# Patient Record
Sex: FEMALE | Race: BLACK OR AFRICAN AMERICAN | URBAN - METROPOLITAN AREA
[De-identification: names, ages, dates, MRNs, and addresses within clinical notes are randomized per-mention and may not be internally consistent; named-entity substitution may affect disease eponyms.]

---

## 2017-02-15 ENCOUNTER — INPATIENT (INPATIENT)
Facility: HOSPITAL | Age: 72
LOS: 7 days | Discharge: HOME | DRG: 255 | End: 2017-02-23
Attending: INTERNAL MEDICINE | Admitting: INTERNAL MEDICINE
Payer: MEDICARE

## 2017-02-15 VITALS
DIASTOLIC BLOOD PRESSURE: 61 MMHG | OXYGEN SATURATION: 100 % | HEART RATE: 87 BPM | SYSTOLIC BLOOD PRESSURE: 129 MMHG | WEIGHT: 104.94 LBS | TEMPERATURE: 97 F | RESPIRATION RATE: 17 BRPM

## 2017-02-15 DIAGNOSIS — F99 MENTAL DISORDER, NOT OTHERWISE SPECIFIED: ICD-10-CM

## 2017-02-15 DIAGNOSIS — Z98.89 OTHER SPECIFIED POSTPROCEDURAL STATES: Chronic | ICD-10-CM

## 2017-02-15 DIAGNOSIS — L98.499 NON-PRESSURE CHRONIC ULCER OF SKIN OF OTHER SITES WITH UNSPECIFIED SEVERITY: ICD-10-CM

## 2017-02-15 DIAGNOSIS — R63.4 ABNORMAL WEIGHT LOSS: ICD-10-CM

## 2017-02-15 DIAGNOSIS — D64.9 ANEMIA, UNSPECIFIED: ICD-10-CM

## 2017-02-15 DIAGNOSIS — E11.9 TYPE 2 DIABETES MELLITUS WITHOUT COMPLICATIONS: ICD-10-CM

## 2017-02-15 DIAGNOSIS — Z09 ENCOUNTER FOR FOLLOW-UP EXAMINATION AFTER COMPLETED TREATMENT FOR CONDITIONS OTHER THAN MALIGNANT NEOPLASM: Chronic | ICD-10-CM

## 2017-02-15 DIAGNOSIS — D69.6 THROMBOCYTOPENIA, UNSPECIFIED: ICD-10-CM

## 2017-02-15 DIAGNOSIS — R63.8 OTHER SYMPTOMS AND SIGNS CONCERNING FOOD AND FLUID INTAKE: ICD-10-CM

## 2017-02-15 DIAGNOSIS — M79.645 PAIN IN LEFT FINGER(S): ICD-10-CM

## 2017-02-15 DIAGNOSIS — I73.9 PERIPHERAL VASCULAR DISEASE, UNSPECIFIED: ICD-10-CM

## 2017-02-15 DIAGNOSIS — Z41.8 ENCOUNTER FOR OTHER PROCEDURES FOR PURPOSES OTHER THAN REMEDYING HEALTH STATE: ICD-10-CM

## 2017-02-15 LAB
ALBUMIN SERPL ELPH-MCNC: 2 G/DL — LOW (ref 3.4–5)
ALBUMIN SERPL ELPH-MCNC: 2.1 G/DL — LOW (ref 3.4–5)
ALP SERPL-CCNC: 90 U/L — SIGNIFICANT CHANGE UP (ref 40–120)
ALP SERPL-CCNC: 92 U/L — SIGNIFICANT CHANGE UP (ref 40–120)
ALT FLD-CCNC: 10 U/L — LOW (ref 12–42)
ALT FLD-CCNC: 15 U/L — SIGNIFICANT CHANGE UP (ref 12–42)
ANION GAP SERPL CALC-SCNC: 12 MMOL/L — SIGNIFICANT CHANGE UP (ref 9–16)
ANION GAP SERPL CALC-SCNC: 9 MMOL/L — SIGNIFICANT CHANGE UP (ref 9–16)
ANION GAP SERPL CALC-SCNC: 9 MMOL/L — SIGNIFICANT CHANGE UP (ref 9–16)
APTT BLD: 32.2 SEC — SIGNIFICANT CHANGE UP (ref 27.5–37.4)
APTT BLD: 32.5 SEC — SIGNIFICANT CHANGE UP (ref 27.5–37.4)
AST SERPL-CCNC: 14 U/L — LOW (ref 15–37)
AST SERPL-CCNC: 9 U/L — LOW (ref 15–37)
BILIRUB SERPL-MCNC: 0.2 MG/DL — SIGNIFICANT CHANGE UP (ref 0.2–1.2)
BILIRUB SERPL-MCNC: 0.2 MG/DL — SIGNIFICANT CHANGE UP (ref 0.2–1.2)
BLD GP AB SCN SERPL QL: NEGATIVE — SIGNIFICANT CHANGE UP
BUN SERPL-MCNC: 34 MG/DL — HIGH (ref 7–23)
BUN SERPL-MCNC: 36 MG/DL — HIGH (ref 7–23)
BUN SERPL-MCNC: 38 MG/DL — HIGH (ref 7–23)
CALCIUM SERPL-MCNC: 7 MG/DL — LOW (ref 8.5–10.5)
CALCIUM SERPL-MCNC: 7.4 MG/DL — LOW (ref 8.5–10.5)
CALCIUM SERPL-MCNC: 7.6 MG/DL — LOW (ref 8.5–10.5)
CHLORIDE SERPL-SCNC: 126 MMOL/L — HIGH (ref 96–108)
CHLORIDE SERPL-SCNC: 126 MMOL/L — HIGH (ref 96–108)
CHLORIDE SERPL-SCNC: 127 MMOL/L — HIGH (ref 96–108)
CO2 SERPL-SCNC: 15 MMOL/L — LOW (ref 22–31)
CO2 SERPL-SCNC: 16 MMOL/L — LOW (ref 22–31)
CO2 SERPL-SCNC: 18 MMOL/L — LOW (ref 22–31)
CREAT SERPL-MCNC: 1.02 MG/DL — SIGNIFICANT CHANGE UP (ref 0.5–1.3)
CREAT SERPL-MCNC: 1.08 MG/DL — SIGNIFICANT CHANGE UP (ref 0.5–1.3)
CREAT SERPL-MCNC: 1.13 MG/DL — SIGNIFICANT CHANGE UP (ref 0.5–1.3)
FERRITIN SERPL-MCNC: 228.4 NG/ML — SIGNIFICANT CHANGE UP (ref 8–252)
GLUCOSE SERPL-MCNC: 130 MG/DL — HIGH (ref 70–99)
GLUCOSE SERPL-MCNC: 79 MG/DL — SIGNIFICANT CHANGE UP (ref 70–99)
GLUCOSE SERPL-MCNC: 96 MG/DL — SIGNIFICANT CHANGE UP (ref 70–99)
HBA1C BLD-MCNC: <3.5 % — LOW (ref 4.8–5.6)
HCT VFR BLD CALC: 22.7 % — LOW (ref 34.5–45)
HCT VFR BLD CALC: 23.8 % — LOW (ref 34.5–45)
HGB BLD-MCNC: 7.3 G/DL — LOW (ref 11.5–15.5)
HGB BLD-MCNC: 7.6 G/DL — LOW (ref 11.5–15.5)
INR BLD: 1.78 — HIGH (ref 0.88–1.16)
INR BLD: 1.86 — HIGH (ref 0.88–1.16)
IRON SATN MFR SERPL: 28 % — SIGNIFICANT CHANGE UP (ref 20–38)
IRON SATN MFR SERPL: 57 UG/DL — SIGNIFICANT CHANGE UP (ref 50–170)
MAGNESIUM SERPL-MCNC: 1.5 MG/DL — LOW (ref 1.6–2.4)
MAGNESIUM SERPL-MCNC: 1.6 MG/DL — SIGNIFICANT CHANGE UP (ref 1.6–2.4)
MCHC RBC-ENTMCNC: 27.4 PG — SIGNIFICANT CHANGE UP (ref 27–34)
MCHC RBC-ENTMCNC: 27.7 PG — SIGNIFICANT CHANGE UP (ref 27–34)
MCHC RBC-ENTMCNC: 31.9 G/DL — LOW (ref 32–36)
MCHC RBC-ENTMCNC: 32.2 G/DL — SIGNIFICANT CHANGE UP (ref 32–36)
MCV RBC AUTO: 85.9 FL — SIGNIFICANT CHANGE UP (ref 80–100)
MCV RBC AUTO: 86 FL — SIGNIFICANT CHANGE UP (ref 80–100)
PHOSPHATE SERPL-MCNC: 3 MG/DL — SIGNIFICANT CHANGE UP (ref 2.5–4.5)
PHOSPHATE SERPL-MCNC: 3 MG/DL — SIGNIFICANT CHANGE UP (ref 2.5–4.5)
PLATELET # BLD AUTO: 63 K/UL — LOW (ref 150–400)
PLATELET # BLD AUTO: 93 K/UL — LOW (ref 150–400)
POTASSIUM SERPL-MCNC: 3.5 MMOL/L — SIGNIFICANT CHANGE UP (ref 3.5–5.3)
POTASSIUM SERPL-MCNC: 3.7 MMOL/L — SIGNIFICANT CHANGE UP (ref 3.5–5.3)
POTASSIUM SERPL-MCNC: 4.1 MMOL/L — SIGNIFICANT CHANGE UP (ref 3.5–5.3)
POTASSIUM SERPL-SCNC: 3.5 MMOL/L — SIGNIFICANT CHANGE UP (ref 3.5–5.3)
POTASSIUM SERPL-SCNC: 3.7 MMOL/L — SIGNIFICANT CHANGE UP (ref 3.5–5.3)
POTASSIUM SERPL-SCNC: 4.1 MMOL/L — SIGNIFICANT CHANGE UP (ref 3.5–5.3)
PROT SERPL-MCNC: 6.3 G/DL — LOW (ref 6.4–8.2)
PROT SERPL-MCNC: 6.4 G/DL — SIGNIFICANT CHANGE UP (ref 6.4–8.2)
PROTHROM AB SERPL-ACNC: 19.9 SEC — HIGH (ref 10–13.1)
PROTHROM AB SERPL-ACNC: 20.8 SEC — HIGH (ref 10–13.1)
RBC # BLD: 2.64 M/UL — LOW (ref 3.8–5.2)
RBC # BLD: 2.77 M/UL — LOW (ref 3.8–5.2)
RBC # FLD: 19.5 % — HIGH (ref 10.3–16.9)
RBC # FLD: 20.2 % — HIGH (ref 10.3–16.9)
RH IG SCN BLD-IMP: POSITIVE — SIGNIFICANT CHANGE UP
SODIUM SERPL-SCNC: 152 MMOL/L — HIGH (ref 135–145)
SODIUM SERPL-SCNC: 153 MMOL/L — HIGH (ref 135–145)
SODIUM SERPL-SCNC: 153 MMOL/L — HIGH (ref 135–145)
TIBC SERPL-MCNC: 204 UG/DL — LOW (ref 250–450)
TRANSFERRIN SERPL-MCNC: 168 MG/DL — LOW (ref 200–360)
TSH SERPL-MCNC: 1.98 UIU/ML — SIGNIFICANT CHANGE UP (ref 0.35–4.94)
WBC # BLD: 6.6 K/UL — SIGNIFICANT CHANGE UP (ref 3.8–10.5)
WBC # BLD: 7.3 K/UL — SIGNIFICANT CHANGE UP (ref 3.8–10.5)
WBC # FLD AUTO: 6.6 K/UL — SIGNIFICANT CHANGE UP (ref 3.8–10.5)
WBC # FLD AUTO: 7.3 K/UL — SIGNIFICANT CHANGE UP (ref 3.8–10.5)

## 2017-02-15 PROCEDURE — 74000: CPT | Mod: 26

## 2017-02-15 PROCEDURE — 93010 ELECTROCARDIOGRAM REPORT: CPT

## 2017-02-15 PROCEDURE — 73130 X-RAY EXAM OF HAND: CPT | Mod: 26,LT

## 2017-02-15 PROCEDURE — 99223 1ST HOSP IP/OBS HIGH 75: CPT

## 2017-02-15 PROCEDURE — 99291 CRITICAL CARE FIRST HOUR: CPT | Mod: 25

## 2017-02-15 PROCEDURE — 99292 CRITICAL CARE ADDL 30 MIN: CPT | Mod: 25

## 2017-02-15 RX ORDER — SODIUM CHLORIDE 9 MG/ML
1000 INJECTION INTRAMUSCULAR; INTRAVENOUS; SUBCUTANEOUS ONCE
Qty: 0 | Refills: 0 | Status: COMPLETED | OUTPATIENT
Start: 2017-02-15 | End: 2017-02-15

## 2017-02-15 RX ORDER — DIVALPROEX SODIUM 500 MG/1
500 TABLET, DELAYED RELEASE ORAL
Qty: 0 | Refills: 0 | Status: DISCONTINUED | OUTPATIENT
Start: 2017-02-15 | End: 2017-02-23

## 2017-02-15 RX ORDER — SODIUM CHLORIDE 9 MG/ML
1000 INJECTION, SOLUTION INTRAVENOUS
Qty: 0 | Refills: 0 | Status: DISCONTINUED | OUTPATIENT
Start: 2017-02-15 | End: 2017-02-15

## 2017-02-15 RX ORDER — FOLIC ACID 0.8 MG
1 TABLET ORAL DAILY
Qty: 0 | Refills: 0 | Status: DISCONTINUED | OUTPATIENT
Start: 2017-02-15 | End: 2017-02-23

## 2017-02-15 RX ORDER — VANCOMYCIN HCL 1 G
VIAL (EA) INTRAVENOUS
Qty: 0 | Refills: 0 | Status: DISCONTINUED | OUTPATIENT
Start: 2017-02-15 | End: 2017-02-15

## 2017-02-15 RX ORDER — PIPERACILLIN AND TAZOBACTAM 4; .5 G/20ML; G/20ML
3.38 INJECTION, POWDER, LYOPHILIZED, FOR SOLUTION INTRAVENOUS ONCE
Qty: 0 | Refills: 0 | Status: COMPLETED | OUTPATIENT
Start: 2017-02-15 | End: 2017-02-15

## 2017-02-15 RX ORDER — QUETIAPINE FUMARATE 200 MG/1
100 TABLET, FILM COATED ORAL AT BEDTIME
Qty: 0 | Refills: 0 | Status: DISCONTINUED | OUTPATIENT
Start: 2017-02-15 | End: 2017-02-23

## 2017-02-15 RX ORDER — INSULIN LISPRO 100/ML
VIAL (ML) SUBCUTANEOUS
Qty: 0 | Refills: 0 | Status: DISCONTINUED | OUTPATIENT
Start: 2017-02-15 | End: 2017-02-23

## 2017-02-15 RX ORDER — THIAMINE MONONITRATE (VIT B1) 100 MG
100 TABLET ORAL DAILY
Qty: 0 | Refills: 0 | Status: DISCONTINUED | OUTPATIENT
Start: 2017-02-15 | End: 2017-02-23

## 2017-02-15 RX ORDER — VANCOMYCIN HCL 1 G
750 VIAL (EA) INTRAVENOUS ONCE
Qty: 0 | Refills: 0 | Status: COMPLETED | OUTPATIENT
Start: 2017-02-15 | End: 2017-02-15

## 2017-02-15 RX ORDER — ACETAMINOPHEN 500 MG
650 TABLET ORAL EVERY 6 HOURS
Qty: 0 | Refills: 0 | Status: DISCONTINUED | OUTPATIENT
Start: 2017-02-15 | End: 2017-02-23

## 2017-02-15 RX ORDER — SODIUM CHLORIDE 9 MG/ML
1000 INJECTION, SOLUTION INTRAVENOUS
Qty: 0 | Refills: 0 | Status: DISCONTINUED | OUTPATIENT
Start: 2017-02-15 | End: 2017-02-16

## 2017-02-15 RX ORDER — HEPARIN SODIUM 5000 [USP'U]/ML
5000 INJECTION INTRAVENOUS; SUBCUTANEOUS EVERY 12 HOURS
Qty: 0 | Refills: 0 | Status: DISCONTINUED | OUTPATIENT
Start: 2017-02-15 | End: 2017-02-16

## 2017-02-15 RX ORDER — BRIMONIDINE TARTRATE 2 MG/MG
1 SOLUTION/ DROPS OPHTHALMIC
Qty: 0 | Refills: 0 | Status: DISCONTINUED | OUTPATIENT
Start: 2017-02-15 | End: 2017-02-23

## 2017-02-15 RX ADMIN — QUETIAPINE FUMARATE 100 MILLIGRAM(S): 200 TABLET, FILM COATED ORAL at 22:40

## 2017-02-15 RX ADMIN — Medication 250 MILLIGRAM(S): at 14:21

## 2017-02-15 RX ADMIN — DIVALPROEX SODIUM 500 MILLIGRAM(S): 500 TABLET, DELAYED RELEASE ORAL at 19:04

## 2017-02-15 RX ADMIN — Medication 1 TABLET(S): at 22:42

## 2017-02-15 RX ADMIN — Medication 100 MILLIGRAM(S): at 22:40

## 2017-02-15 RX ADMIN — SODIUM CHLORIDE 100 MILLILITER(S): 9 INJECTION, SOLUTION INTRAVENOUS at 22:39

## 2017-02-15 RX ADMIN — Medication 2: at 22:39

## 2017-02-15 RX ADMIN — SODIUM CHLORIDE 2000 MILLILITER(S): 9 INJECTION INTRAMUSCULAR; INTRAVENOUS; SUBCUTANEOUS at 13:04

## 2017-02-15 RX ADMIN — PIPERACILLIN AND TAZOBACTAM 200 GRAM(S): 4; .5 INJECTION, POWDER, LYOPHILIZED, FOR SOLUTION INTRAVENOUS at 13:04

## 2017-02-15 RX ADMIN — Medication 1 MILLIGRAM(S): at 22:42

## 2017-02-15 RX ADMIN — BRIMONIDINE TARTRATE 1 DROP(S): 2 SOLUTION/ DROPS OPHTHALMIC at 19:04

## 2017-02-15 NOTE — ED PROVIDER NOTE - ATTENDING CONTRIBUTION TO CARE
71yoF h/o bipolar, schizophrenia, HTN, Raynaud's syndrome, here with pain to L 2nd finger, started several weeks ago, getting worse. No f/c, no trauma or injury. Of note, pt's HHA reports pt with poor appetite and progressive functional decline, having lost over 40 lbs in the past few months. No cp/sob, no abd pain, no n/v/d. No dizziness. Vitals stable, exam with hyperpigmention to distal tip of L 2nd phalanx, o/w thin fragile woman. Pt seen by vascular no acute intervention recommended from their perspective, labs c/w dehydration, EKG unremarkable, no change in mental status, will admit for IV hydration, hypernatremia, failure to thrive. 71yoF h/o bipolar, schizophrenia, HTN, Raynaud's syndrome, here with pain to L 2nd finger, started several weeks ago, getting worse. No f/c, no trauma or injury. Of note, pt's HHA reports pt with poor appetite and progressive functional decline, having lost over 40 lbs in the past few months. No cp/sob, no abd pain, no n/v/d. No dizziness. Vitals stable, exam with hyperpigmention to distal tip of L 2nd phalanx c/w dry gangrene with surrounding cellulitis, o/w thin fragile woman. Pt seen by vascular no acute intervention recommended from their perspective, labs c/w dehydration, EKG unremarkable, no change in mental status, will admit for IV hydration, hypernatremia, failure to thrive.

## 2017-02-15 NOTE — ED ADULT NURSE NOTE - OBJECTIVE STATEMENT
Patient presents to ED with c/o pain to left 2nd finger. As per patient's aide, patient scheduled for amputation to left 2nd finger on 3/1/17 due to poor circulation however c/o severe pain. Discoloration noted to left second finger. Patient reports able to feel when touching. Radial pulse to left arm strong. No s/s acute distress noted. Awaiting to be seen.

## 2017-02-15 NOTE — CONSULT NOTE ADULT - ASSESSMENT
71F with 2nd digit cellulitis  -recommend left hand x-ray, 3 views to rule out osteomyelitis  -recommend hand surgery consultation for further work up and management  -local wound care for lower extremity lesion  -defer to hand surgery for operative plan for digit amputation, if any

## 2017-02-15 NOTE — H&P ADULT. - PROBLEM SELECTOR PLAN 2
Hb fluctuating, ranging 7-9 from previous admissions. Hb today 7.3. Patient with BRBPR 2 days ago, however, rectal exam in ED without gross blood. Stool exam: soft, brown stool. Low concern for GIB despite decreased Hb and elevated BUN. HD stable. Last colonoscopy in the last few months (though unreliable)   - Unknown cardiac function, will trend Hb and transfuse if < 7.   - maintain active type and screens   - iron studies, b12, folate, TSH   - will obtain collateral from PCP and nursing aid when she comes back to hospital

## 2017-02-15 NOTE — ED PROVIDER NOTE - OBJECTIVE STATEMENT
72 yo F poor historian w/ PMHx of bipolar d/o and schizophrenia, Parkinson's disease, PVD w/ possible Raynauds's, s/p rt middle finger amputation presents for progressively worsening pain and discoloration to left 2nd digit for the past few weeks.  Pt was scheduled to have amputation w/ Dr. Ferrara in March but came today because states pain and discolration getting worse.  pt also reports pain to rt 3rd digit for past few weeks.  pt otherwise denies fevers, chills, numbness/tingling, chest pain, sob, abdominal pain, accidents/injuries/traumas 72 yo F poor historian w/ PMHx of bipolar d/o and schizophrenia, Parkinson's disease, PVD w/ possible Raynauds's, s/p rt middle finger amputation presents for progressively worsening pain and discoloration to left 2nd digit for the past few weeks.  Pt was scheduled to have amputation w/ unknown hand surgeon in March   (?Dr. vincent) but came today because states pain and discoloration getting worse.  pt also reports pain to rt 3rd digit for past few weeks. As per home health aide pt also has lost significant amount of weight in the last few months 40+ lbs.  pt otherwise denies fevers, chills, numbness/tingling, chest pain, sob, abdominal pain, accidents/injuries/traumas

## 2017-02-15 NOTE — H&P ADULT. - ASSESSMENT
72 y/o female with pmhx of PVD s/p right finger amputation, bipolar, schizophrenia, parkinson's DM presents with worsening finger pain.

## 2017-02-15 NOTE — H&P ADULT. - ATTENDING COMMENTS
71F hx bipolar dx, schizophrenia, parkinson's, DM, PVD (suspected Raynaud's) s/p right middle finger amputation, hx of DVT (was on Xarelto, ? if still is) presents with L 2nd digit dry gangrene as chief complaint. Pt is scheduled for amputation in March, but pain worse now, prompting health aide to bring patient in. History from patient limited 2/2 lack of insight and ?cognitive impairment, but pt denies fevers, chills. She says that she has lost a significant amount of weight despite a normal appetite (unclear if she is eating well at home). She says she has had persistent N/V/D for "some time" that comes and goes and has recently had BRBPR, although brown stool noted on rectal exam (no guaiac performed). She was noted to have multiple skin ulcers, notably on her L thigh, R lower leg, and extensively on the back/buttock/sacral area. She appears generally not well cared for.  AFVSS  NAD, AAOx3, cachectic, chronically-ill appearing but not acutely "sick" on visual inspection  Dry MMs  CV/Pulm unremarkable  Abdomen distended and tympanic without TTP  Ext with multiple ulcers as described above, all dry-appearing and do not look infected, L 2nd digit with dry gangrene and TTP proximally  Labs and imaging reviewed  A/P: 71F with multiple medical and psychological problems as above, who is a poor historian, who presented with worsening pain in her L 2nd digit in setting of dry gangrene and found to have a myriad of other problems. Vascular consulted for gangrene and deferred to hand surgery, who was reportedly called from ED and want to defer operative management until more stable medically (this was per report of PGY-1, who was told this by the ED, none of which is documented in ED notes or consult notes). Will need to follow-up with hand surgery in the AM. Will hold abx currently as pt afebrile and without leukocytosis, thus not appearing acutely infected.   The patient in general appears severely malnourished and poorly cared for with a recent unquantified weight loss, cachectic, almost Kwashiorkor-like, appearance, and multiple decubiti/arterial-insufficiency ulcers. 71F hx bipolar dx, schizophrenia, parkinson's, DM, PVD (suspected Raynaud's) s/p right middle finger amputation, hx of DVT (was on Xarelto, ? if still is) presents with L 2nd digit dry gangrene as chief complaint. Pt is scheduled for amputation in March, but pain worse now, prompting health aide to bring patient in. History from patient limited 2/2 lack of insight and ?cognitive impairment, but pt denies fevers, chills. She says that she has lost a significant amount of weight despite a normal appetite (unclear if she is eating well at home). She says she has had persistent N/V/D for "some time" that comes and goes and has recently had BRBPR, although brown stool noted on rectal exam (no guaiac performed). She was noted to have multiple skin ulcers, notably on her L thigh, R lower leg, and extensively on the back/buttock/sacral area. She appears generally not well cared for.  AFVSS  NAD, AAOx3, cachectic, chronically-ill appearing but not acutely "sick" on visual inspection  Dry MMs  CV/Pulm unremarkable  Abdomen distended and tympanic without TTP  Ext with multiple ulcers as described above, all dry-appearing and do not look infected, L 2nd digit with dry gangrene and TTP proximally  Labs and imaging reviewed  A/P: 71F with multiple medical and psychological problems as above, who is a poor historian, who presented with worsening pain in her L 2nd digit in setting of dry gangrene and found to have a myriad of other problems. Vascular consulted for gangrene and deferred to hand surgery, who was reportedly called from ED and want to defer operative management until more stable medically (this was per report of PGY-1, who was told this by the ED, none of which is documented in ED notes or consult notes). Will need to follow-up with hand surgery in the AM. Will hold abx currently as pt afebrile and without leukocytosis, thus not appearing acutely infected.   The patient in general appears severely malnourished and poorly cared for with a recent unquantified weight loss, and cachectic, almost Kwashiorkor-like, appearance, and multiple decubiti/arterial-insufficiency ulcers. Her labs indicated hypernatremia and a nonanion gap metabolic acidosis, possibly 2/2 chronic diarrhea. She also has a normocytic anemia, which seems to be an anemia of chronic disease, and unlikely 2/2 acute bleeding despite the vague report of BRBPR given her Hgb was stable on repeat and her rectal exam revealed brown stool. Her anemia and thrombocytopenia seem to be chronic based on previous labs, but are of unclear etiology at this time. For the hypernatremia and acidosis, will start D5W with 1amp of sodium bicarb (hypotonic fluid), which should improve both parameters. Will check serial BMPs to ensure these abnormalities are correcting appropriately.  As pt with multiple ulcers and skin breakdown, concern for multiple vitamin deficiencies, including Vitamin C (Scurvy) and possibly Vitamin B3 (Pellagra--given report of diarrhea and multiple skin lesion). Will check levels of these. Will start MVI, thiamine, folate, Vit C at this time. Will obtain nutrition consult and wound care consult for cachexia and skin breakdown.  The patient is also unable to give a list of medications that she takes. The admitting resident attempted to call her pharmacy for collateral, but was only able to confirm that she was on Depakote and Seroquel, which we will restart for now. Will need to obtain more collateral regarding her home meds from her PMD in the AM, specifically if she is still taking Xarelto, which was prescribed last admission for reported DVT. Her coags show an elevated INR, which may be from Xarelto, but could also be from Vit K deficiency given her clinical picture. Will hold Xarelto for now pending further information.  Her abdomen was distended on exam but nontender and soft. Her abdominal Xray shows multiple distended loops of bowel but there does not seem to be an obstruction on my read, but will speak with radiology for a more definitive reading and recommendation for further work-up. 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  In addition, can not rule out an underlying malignancy at this time, although pt is per report, up to date with colonoscopy and mammogram screening. She may benefit from further work-up as an outpatient. In general, the patient appears stable at this time, but her multitude of problems and poor nutritional status indicate a poor prognosis.

## 2017-02-15 NOTE — H&P ADULT. - PROBLEM SELECTOR PLAN 4
multiple ulcers with stage 2 on her left thigh, and stage 2-3 on sacral/ decubitus, extending to gluteal folds. patient afebrile on admission without leukocytosis. Will obtain stat wound care consult for evaluation

## 2017-02-15 NOTE — CONSULT NOTE ADULT - SUBJECTIVE AND OBJECTIVE BOX
Vascular Attending:        HPI: 71F with h/o right middle finger distal phalanx amputation done by hand surgeon presents with left 2nd digit cellulitis and worsenig pain. Patient had a scheduled 2nd digit amputation on March 1, 2017 with Dr. Arias (hand surgeon) however the patient noticed more pain than normal and came to the ED for further medical attention. Patient denies fevers, chills, or sweats. Denies any pain in legs or worsening wound in leg.      PAST MEDICAL & SURGICAL HISTORY:  Thrombocytopenia  Ulcer: b/l LE  Gangrene: right 3rd digit  PVD (peripheral vascular disease)  Glaucoma  Bipolar disorder  Schizophrenia  DM (diabetes mellitus)  H/O eye surgery  S/P exploratory laparotomy  H/O knee surgery  S/P lung surgery, follow-up exam      REVIEW OF SYSTEMS per HPI        Allergies    No Known Allergies    Intolerances        SOCIAL HISTORY: NC    FAMILY HISTORY: NC      Vital Signs Last 24 Hrs  T(C): 36.2, Max: 36.2 (02-15 @ 10:34)  T(F): 97.2, Max: 97.2 (02-15 @ 10:34)  HR: 87 (87 - 87)  BP: 129/61 (129/61 - 129/61)  BP(mean): --  RR: 17 (17 - 17)  SpO2: 100% (100% - 100%)    PHYSICAL EXAM:    PE:  General: NAD  Chest: CTA, +s1,s2  Abd: Soft, NDNT, no rebound  Right hand: Middle finger distal phalanx amputated, well healed. Motor and sensory grossly intact  Left hand: 2nd digit cellulitis with distal phalanx dry gangrene, no areas of fluctuance; no other digits with cellulitis or lesions; motor and sensory intact  LLE: lateral plantar aspect small lesion without drainage or cellulitis  RLE: posterior lower leg open wound with minimal drainage, no purulence, no areas of fluctuance or erythema.  Vasc: palpable DP pulses bilaterally      LABS:                        7.3    6.6   )-----------( 63       ( 15 Feb 2017 11:56 )             22.7           PT/INR - ( 15 Feb 2017 11:56 )   PT: 19.9 sec;   INR: 1.78          PTT - ( 15 Feb 2017 11:56 )  PTT:32.2 sec      RADIOLOGY & ADDITIONAL STUDIES

## 2017-02-15 NOTE — ED ADULT NURSE REASSESSMENT NOTE - NS ED NURSE REASSESS COMMENT FT1
pt turned and positioned. pt found to have stage 2, diffuse, on buttock with DTI. pt also presents with stage 3 to R calf and stage 3 to L thigh. dsgs removed and changed. report given to floor rn, awaiting transport.

## 2017-02-15 NOTE — H&P ADULT. - PMH
Bipolar disorder    Diabetes    Gangrene  right 3rd digit  Glaucoma    Schizophrenia    Thrombocytopenia    Ulcer  b/l LE

## 2017-02-15 NOTE — ED PROVIDER NOTE - NEUROLOGICAL, MLM
Alert and oriented, no focal deficits, no motor or sensory deficits, neurovascularly intact, palpable pulses to b/l upper extremities

## 2017-02-15 NOTE — ED PROVIDER NOTE - MEDICAL DECISION MAKING DETAILS
case d/w Dr. Jaramillo, 72 yo F poor historian w/ PMHx of bipolar d/o and schizophrenia, Parkinson's disease, PVD w/ possible Raynauds's, s/p rt middle finger amputation presents for progressively worsening pain and discoloration to left 2nd digit for the past few weeks. Pt likely with osteo/gangrene to left distal 2nd phalanx.  Plan for xrays/preop labs.  Vascular surgery consulted awaiting further recommendations

## 2017-02-15 NOTE — ED PROVIDER NOTE - CRITICAL CARE PROVIDED
consultation with other physicians/documentation/interpretation of diagnostic studies/additional history taking

## 2017-02-15 NOTE — ED ADULT NURSE NOTE - PMH
Bipolar disorder    Gangrene  right 3rd digit  Glaucoma    Schizophrenia    Thrombocytopenia    Ulcer  b/l LE

## 2017-02-15 NOTE — H&P ADULT. - PROBLEM SELECTOR PLAN 6
extensive history of PVD with multiple surgeries in the past. right lower extremity larger than left with poor DP pulses. 1+ on right, 2+ on left. Not on any AC according to pharmacy despite [facility] saying shes on Rivaroxaban   - doppler US lower extremities b/l  -will f/u vascular recommendations for further management

## 2017-02-15 NOTE — H&P ADULT. - PROBLEM SELECTOR PLAN 1
pt with hx of extensive PVD s/p right finger amputation in Oct 2016. Patient with left finger, 2nd digit pain with discoloration x 1 month. According to vascular, amputation was planned for March 2017. Brought into ED by aid b/c of 9/10 pain at home with minimal relief with Advil. VS stable upon arrival. Given Vancomycin + Zosyn for concern of infection. Vascular and hand surgery consulted, who know the patient from previous admissions. Etiology most likely dry gangrene in setting of PVD, DM similar history. Physical exam remarkable for black, necrotic area over distal left 2nd digit. Painful to touch. normal motor and sensory function. Afebrile without leukocytosis, low concern for sepsis.   - will follow vascular and hand surgery rec's. would like to fix acute issues first before surgery   - tylenol 650 q6h PRN for pain pt with hx of extensive PVD s/p right finger amputation in Oct 2016. Patient with left finger, 2nd digit pain with discoloration x 1 month. According to vascular, amputation was planned for March 2017. Brought into ED by aid b/c of 9/10 pain at home with minimal relief with Advil. VS stable upon arrival. Given Vancomycin + Zosyn for concern of infection. Vascular and hand surgery consulted, who know the patient from previous admissions. Etiology most likely dry gangrene in setting of PVD, DM similar history. Physical exam remarkable for black, necrotic area over distal left 2nd digit. Painful to touch. normal motor and sensory function. Afebrile without leukocytosis, low concern for sepsis.   - will follow vascular and hand surgery rec's. would like to fix acute issues first before surgery   - Tylenol 650 q6h PRN for pain pt with hx of extensive PVD s/p right finger amputation in Oct 2016. Patient with left finger, 2nd digit pain with discoloration x 1 month. According to vascular, amputation was planned for March 2017. Brought into ED by aid b/c of 9/10 pain at home with minimal relief with Advil. VS stable upon arrival. Given Vancomycin + Zosyn for concern of infection. Vascular and hand surgery consulted, who know the patient from previous admissions. Etiology most likely dry gangrene in setting of PVD, DM similar history. Physical exam remarkable for black, necrotic area over distal left 2nd digit. Painful to touch. normal motor and sensory function. Afebrile without leukocytosis, low concern for sepsis.   - will follow vascular and hand surgery rec's. would like to fix acute issues first before surgery   - Tylenol 650 q6h PRN for pain  - no indication for abx at this time

## 2017-02-15 NOTE — H&P ADULT. - PROBLEM SELECTOR PLAN 3
Fluctuating from 60's-100's. Unknown etiology. Will try to obtain collateral from PCP. Will trend daily and follow up anemia workup

## 2017-02-15 NOTE — H&P ADULT. - PSH
H/O eye surgery    H/O knee surgery    S/P exploratory laparotomy    S/P lung surgery, follow-up exam

## 2017-02-15 NOTE — ED PROVIDER NOTE - PROGRESS NOTE DETAILS
Hand Dr. Bray consulted will see pt, however given severe dehydration and failure to thrive, pt to be primarily managed on medical floor and then when cleared for surgery dr. bray can perform procedure, social work also consulted

## 2017-02-15 NOTE — H&P ADULT. - PROBLEM SELECTOR PLAN 8
hx of bipolar and schizophrenia. on Seroquel 100mg at bedtime and Depakote 500mg BID. Consider psychiatric consult

## 2017-02-15 NOTE — H&P ADULT. - RS GEN PE MLT RESP DETAILS PC
good air movement/normal/breath sounds equal/respirations non-labored/clear to auscultation bilaterally

## 2017-02-15 NOTE — H&P ADULT. - SKIN COMMENTS
sacral + decubitus ulcer (stage 1-3) extending from sacrum to gluteal folds with satellite lesions and bleeding

## 2017-02-15 NOTE — ED PROVIDER NOTE - SKIN, MLM
Skin normal color for race, hyperpigmentation/dark discoloration to left distal 2nd phalanx ttp, ttp of left distal middle phalanx, no gross discoloration/swelling or deformities,   No evidence of rash.

## 2017-02-15 NOTE — ED ADULT TRIAGE NOTE - CHIEF COMPLAINT QUOTE
Pt c/o pain to 2nd and 3 rd digit of the left hand. Stated "They suppose to amputate mi finger on March first, but the pain is so bad I can not wait till March." Notable discoloration to the distal portion of the 2nd digit of the left hand.

## 2017-02-15 NOTE — ED PROVIDER NOTE - CONSTITUTIONAL, MLM
normal... cachetic, poor hygiene, awake, alert, oriented to person, place, time/situation and in no apparent distress.

## 2017-02-15 NOTE — H&P ADULT. - PROBLEM SELECTOR PLAN 5
> 40 lbs weight loss in recent months as per home aid. Unclear etiology. Patient poor historian. Says last colonoscopy and mammogram was in the "last few months". Patient cachectic, malnourished, with temporal wasting on exam.   - will obtain collateral > 40 lbs weight loss in recent months as per home aid. Unclear etiology. Patient poor historian. Says last colonoscopy and mammogram was in the "last few months". Patient cachectic, malnourished, with temporal wasting on exam. Low albumin  - will obtain collateral  - get nutrition consult

## 2017-02-15 NOTE — H&P ADULT. - PROBLEM SELECTOR PLAN 10
#HyperNatremia: 153 on admission. likely 2/2 decreased PO intake. FWD 2.4L. s/p 2 L NS bolus in ED. will c/w 1/5 NS @ 100cc/hr     monitor/ replete lytes PRN   regular diet     Dispo: pending clinical improvement     FULL CODE #HyperNatremia: 153 on admission. likely 2/2 decreased PO intake. FWD 2.4L. s/p 2 L NS bolus in ED. will c/w 1/5 NS @ 100cc/hr     monitor/ replete lytes PRN   regular diet     Dispo: pending clinical improvement   PT consult    FULL CODE

## 2017-02-15 NOTE — H&P ADULT. - EXTREMITIES COMMENTS
Left Leg: anterior thigh ulcer (stage 2-3)   Right Leg: anterior thigh mass, firm, nontender  Dry skin

## 2017-02-15 NOTE — H&P ADULT. - PROBLEM SELECTOR PLAN 7
patient reports a hx of DM, not on any home anti-glycemics. 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 Will place on moderate dose sliding scale and adjust need for basal insulin based off requirements patient reports a hx of DM, not on any home anti-glycemics. 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 Will place on moderate dose sliding scale and adjust need for basal insulin based off requirements. obtain HbA1c

## 2017-02-16 DIAGNOSIS — T68.XXXA HYPOTHERMIA, INITIAL ENCOUNTER: ICD-10-CM

## 2017-02-16 DIAGNOSIS — I82.409 ACUTE EMBOLISM AND THROMBOSIS OF UNSPECIFIED DEEP VEINS OF UNSPECIFIED LOWER EXTREMITY: ICD-10-CM

## 2017-02-16 DIAGNOSIS — R14.0 ABDOMINAL DISTENSION (GASEOUS): ICD-10-CM

## 2017-02-16 DIAGNOSIS — E87.2 ACIDOSIS: ICD-10-CM

## 2017-02-16 DIAGNOSIS — L89.153 PRESSURE ULCER OF SACRAL REGION, STAGE 3: ICD-10-CM

## 2017-02-16 DIAGNOSIS — I96 GANGRENE, NOT ELSEWHERE CLASSIFIED: ICD-10-CM

## 2017-02-16 DIAGNOSIS — Z01.818 ENCOUNTER FOR OTHER PREPROCEDURAL EXAMINATION: ICD-10-CM

## 2017-02-16 LAB
ANION GAP SERPL CALC-SCNC: 12 MMOL/L — SIGNIFICANT CHANGE UP (ref 9–16)
ANION GAP SERPL CALC-SCNC: 13 MMOL/L — SIGNIFICANT CHANGE UP (ref 9–16)
ANION GAP SERPL CALC-SCNC: 13 MMOL/L — SIGNIFICANT CHANGE UP (ref 9–16)
ANION GAP SERPL CALC-SCNC: 14 MMOL/L — SIGNIFICANT CHANGE UP (ref 9–16)
APPEARANCE UR: (no result)
BACTERIA # UR AUTO: PRESENT /HPF
BASE EXCESS BLDA CALC-SCNC: -7.6 MMOL/L — LOW (ref -2–3)
BILIRUB UR-MCNC: NEGATIVE — SIGNIFICANT CHANGE UP
BUN SERPL-MCNC: 30 MG/DL — HIGH (ref 7–23)
BUN SERPL-MCNC: 31 MG/DL — HIGH (ref 7–23)
BUN SERPL-MCNC: 35 MG/DL — HIGH (ref 7–23)
BUN SERPL-MCNC: 37 MG/DL — HIGH (ref 7–23)
CALCIUM SERPL-MCNC: 7.5 MG/DL — LOW (ref 8.5–10.5)
CALCIUM SERPL-MCNC: 7.6 MG/DL — LOW (ref 8.5–10.5)
CALCIUM SERPL-MCNC: 7.6 MG/DL — LOW (ref 8.5–10.5)
CALCIUM SERPL-MCNC: 7.7 MG/DL — LOW (ref 8.5–10.5)
CHLORIDE SERPL-SCNC: 119 MMOL/L — HIGH (ref 96–108)
CHLORIDE SERPL-SCNC: 120 MMOL/L — HIGH (ref 96–108)
CHLORIDE SERPL-SCNC: 121 MMOL/L — HIGH (ref 96–108)
CHLORIDE SERPL-SCNC: 126 MMOL/L — HIGH (ref 96–108)
CHLORIDE UR-SCNC: 32 MMOL/L — SIGNIFICANT CHANGE UP
CO2 SERPL-SCNC: 14 MMOL/L — LOW (ref 22–31)
CO2 SERPL-SCNC: 17 MMOL/L — LOW (ref 22–31)
COLOR SPEC: YELLOW — SIGNIFICANT CHANGE UP
CREAT ?TM UR-MCNC: 48.5 MG/DL — SIGNIFICANT CHANGE UP
CREAT SERPL-MCNC: 0.98 MG/DL — SIGNIFICANT CHANGE UP (ref 0.5–1.3)
CREAT SERPL-MCNC: 1.04 MG/DL — SIGNIFICANT CHANGE UP (ref 0.5–1.3)
CREAT SERPL-MCNC: 1.07 MG/DL — SIGNIFICANT CHANGE UP (ref 0.5–1.3)
CREAT SERPL-MCNC: 1.11 MG/DL — SIGNIFICANT CHANGE UP (ref 0.5–1.3)
CRP SERPL-MCNC: 2.24 MG/DL — HIGH
DIFF PNL FLD: (no result)
EPI CELLS # UR: SIGNIFICANT CHANGE UP /HPF
FOLATE SERPL-MCNC: >20 NG/ML — SIGNIFICANT CHANGE UP (ref 4.8–24.2)
GAS PNL BLDA: SIGNIFICANT CHANGE UP
GLUCOSE SERPL-MCNC: 126 MG/DL — HIGH (ref 70–99)
GLUCOSE SERPL-MCNC: 142 MG/DL — HIGH (ref 70–99)
GLUCOSE SERPL-MCNC: 147 MG/DL — HIGH (ref 70–99)
GLUCOSE SERPL-MCNC: 175 MG/DL — HIGH (ref 70–99)
GLUCOSE UR QL: NEGATIVE — SIGNIFICANT CHANGE UP
HAPTOGLOB SERPL-MCNC: 118 MG/DL — SIGNIFICANT CHANGE UP (ref 31–207)
HCO3 BLDA-SCNC: 16 MMOL/L — LOW (ref 21–28)
HCT VFR BLD CALC: 17.8 % — CRITICAL LOW (ref 34.5–45)
HCT VFR BLD CALC: 23 % — LOW (ref 34.5–45)
HGB BLD-MCNC: 5.7 G/DL — CRITICAL LOW (ref 11.5–15.5)
HGB BLD-MCNC: 7.5 G/DL — LOW (ref 11.5–15.5)
KETONES UR-MCNC: NEGATIVE — SIGNIFICANT CHANGE UP
LACTATE SERPL-SCNC: 1.5 MMOL/L — SIGNIFICANT CHANGE UP (ref 0.5–2)
LDH SERPL L TO P-CCNC: 144 U/L — SIGNIFICANT CHANGE UP (ref 84–246)
LEUKOCYTE ESTERASE UR-ACNC: (no result)
MAGNESIUM SERPL-MCNC: 1.6 MG/DL — SIGNIFICANT CHANGE UP (ref 1.6–2.4)
MCHC RBC-ENTMCNC: 27.1 PG — SIGNIFICANT CHANGE UP (ref 27–34)
MCHC RBC-ENTMCNC: 27.5 PG — SIGNIFICANT CHANGE UP (ref 27–34)
MCHC RBC-ENTMCNC: 32 G/DL — SIGNIFICANT CHANGE UP (ref 32–36)
MCHC RBC-ENTMCNC: 32.6 G/DL — SIGNIFICANT CHANGE UP (ref 32–36)
MCV RBC AUTO: 83 FL — SIGNIFICANT CHANGE UP (ref 80–100)
MCV RBC AUTO: 86 FL — SIGNIFICANT CHANGE UP (ref 80–100)
NITRITE UR-MCNC: POSITIVE
OSMOLALITY UR: 391 MOSMOL/KG — SIGNIFICANT CHANGE UP (ref 100–650)
PCO2 BLDA: 27 MMHG — LOW (ref 32–45)
PH BLDA: 7.38 — SIGNIFICANT CHANGE UP (ref 7.35–7.45)
PH UR: 6 — SIGNIFICANT CHANGE UP (ref 4–8)
PHOSPHATE SERPL-MCNC: 3.1 MG/DL — SIGNIFICANT CHANGE UP (ref 2.5–4.5)
PLATELET # BLD AUTO: 68 K/UL — LOW (ref 150–400)
PLATELET # BLD AUTO: 71 K/UL — LOW (ref 150–400)
PO2 BLDA: 208 MMHG — HIGH (ref 83–108)
POTASSIUM SERPL-MCNC: 3.5 MMOL/L — SIGNIFICANT CHANGE UP (ref 3.5–5.3)
POTASSIUM SERPL-MCNC: 3.7 MMOL/L — SIGNIFICANT CHANGE UP (ref 3.5–5.3)
POTASSIUM SERPL-SCNC: 3.5 MMOL/L — SIGNIFICANT CHANGE UP (ref 3.5–5.3)
POTASSIUM SERPL-SCNC: 3.7 MMOL/L — SIGNIFICANT CHANGE UP (ref 3.5–5.3)
POTASSIUM UR-SCNC: 11 MMOL/L — SIGNIFICANT CHANGE UP
PROT UR-MCNC: 30 MG/DL
RBC # BLD: 2.07 M/UL — LOW (ref 3.8–5.2)
RBC # BLD: 2.77 M/UL — LOW (ref 3.8–5.2)
RBC # FLD: 17.4 % — HIGH (ref 10.3–16.9)
RBC # FLD: 19 % — HIGH (ref 10.3–16.9)
RBC CASTS # UR COMP ASSIST: (no result) /HPF
SAO2 % BLDA: 99 % — SIGNIFICANT CHANGE UP (ref 95–100)
SODIUM SERPL-SCNC: 149 MMOL/L — HIGH (ref 135–145)
SODIUM SERPL-SCNC: 150 MMOL/L — HIGH (ref 135–145)
SODIUM SERPL-SCNC: 152 MMOL/L — HIGH (ref 135–145)
SODIUM SERPL-SCNC: 152 MMOL/L — HIGH (ref 135–145)
SODIUM UR-SCNC: 36 MMOL/L — SIGNIFICANT CHANGE UP
SP GR SPEC: 1.02 — SIGNIFICANT CHANGE UP (ref 1–1.03)
UROBILINOGEN FLD QL: 0.2 E.U./DL — SIGNIFICANT CHANGE UP
VIT B12 SERPL-MCNC: 946 PG/ML — HIGH (ref 243–894)
WBC # BLD: 6.2 K/UL — SIGNIFICANT CHANGE UP (ref 3.8–10.5)
WBC # BLD: 6.3 K/UL — SIGNIFICANT CHANGE UP (ref 3.8–10.5)
WBC # FLD AUTO: 6.2 K/UL — SIGNIFICANT CHANGE UP (ref 3.8–10.5)
WBC # FLD AUTO: 6.3 K/UL — SIGNIFICANT CHANGE UP (ref 3.8–10.5)
WBC UR QL: (no result) /HPF

## 2017-02-16 PROCEDURE — 93970 EXTREMITY STUDY: CPT | Mod: 26

## 2017-02-16 PROCEDURE — 71010: CPT | Mod: 26

## 2017-02-16 PROCEDURE — 93010 ELECTROCARDIOGRAM REPORT: CPT

## 2017-02-16 PROCEDURE — 74177 CT ABD & PELVIS W/CONTRAST: CPT | Mod: 26

## 2017-02-16 PROCEDURE — 99233 SBSQ HOSP IP/OBS HIGH 50: CPT | Mod: GC

## 2017-02-16 RX ORDER — ASCORBIC ACID 60 MG
250 TABLET,CHEWABLE ORAL DAILY
Qty: 0 | Refills: 0 | Status: DISCONTINUED | OUTPATIENT
Start: 2017-02-16 | End: 2017-02-23

## 2017-02-16 RX ORDER — POTASSIUM CHLORIDE 20 MEQ
40 PACKET (EA) ORAL ONCE
Qty: 0 | Refills: 0 | Status: COMPLETED | OUTPATIENT
Start: 2017-02-16 | End: 2017-02-16

## 2017-02-16 RX ORDER — DIATRIZOATE MEGLUMINE 180 MG/ML
30 INJECTION, SOLUTION INTRAVESICAL ONCE
Qty: 0 | Refills: 0 | Status: COMPLETED | OUTPATIENT
Start: 2017-02-16 | End: 2017-02-16

## 2017-02-16 RX ORDER — VANCOMYCIN HCL 1 G
VIAL (EA) INTRAVENOUS
Qty: 0 | Refills: 0 | Status: DISCONTINUED | OUTPATIENT
Start: 2017-02-16 | End: 2017-02-20

## 2017-02-16 RX ORDER — CEFTRIAXONE 500 MG/1
1 INJECTION, POWDER, FOR SOLUTION INTRAMUSCULAR; INTRAVENOUS ONCE
Qty: 0 | Refills: 0 | Status: DISCONTINUED | OUTPATIENT
Start: 2017-02-16 | End: 2017-02-16

## 2017-02-16 RX ORDER — HEPARIN SODIUM 5000 [USP'U]/ML
600 INJECTION INTRAVENOUS; SUBCUTANEOUS
Qty: 25000 | Refills: 0 | Status: DISCONTINUED | OUTPATIENT
Start: 2017-02-16 | End: 2017-02-17

## 2017-02-16 RX ORDER — ENOXAPARIN SODIUM 100 MG/ML
50 INJECTION SUBCUTANEOUS
Qty: 0 | Refills: 0 | Status: DISCONTINUED | OUTPATIENT
Start: 2017-02-16 | End: 2017-02-16

## 2017-02-16 RX ORDER — PIPERACILLIN AND TAZOBACTAM 4; .5 G/20ML; G/20ML
3.38 INJECTION, POWDER, LYOPHILIZED, FOR SOLUTION INTRAVENOUS ONCE
Qty: 0 | Refills: 0 | Status: COMPLETED | OUTPATIENT
Start: 2017-02-16 | End: 2017-02-16

## 2017-02-16 RX ORDER — POLYETHYLENE GLYCOL 3350 17 G/17G
17 POWDER, FOR SOLUTION ORAL
Qty: 0 | Refills: 0 | Status: DISCONTINUED | OUTPATIENT
Start: 2017-02-16 | End: 2017-02-23

## 2017-02-16 RX ORDER — POTASSIUM CHLORIDE 20 MEQ
40 PACKET (EA) ORAL ONCE
Qty: 0 | Refills: 0 | Status: DISCONTINUED | OUTPATIENT
Start: 2017-02-16 | End: 2017-02-16

## 2017-02-16 RX ORDER — PIPERACILLIN AND TAZOBACTAM 4; .5 G/20ML; G/20ML
3.38 INJECTION, POWDER, LYOPHILIZED, FOR SOLUTION INTRAVENOUS EVERY 6 HOURS
Qty: 0 | Refills: 0 | Status: DISCONTINUED | OUTPATIENT
Start: 2017-02-16 | End: 2017-02-20

## 2017-02-16 RX ORDER — VANCOMYCIN HCL 1 G
750 VIAL (EA) INTRAVENOUS EVERY 24 HOURS
Qty: 0 | Refills: 0 | Status: DISCONTINUED | OUTPATIENT
Start: 2017-02-17 | End: 2017-02-20

## 2017-02-16 RX ORDER — VANCOMYCIN HCL 1 G
750 VIAL (EA) INTRAVENOUS ONCE
Qty: 0 | Refills: 0 | Status: COMPLETED | OUTPATIENT
Start: 2017-02-16 | End: 2017-02-16

## 2017-02-16 RX ORDER — MAGNESIUM SULFATE 500 MG/ML
1 VIAL (ML) INJECTION ONCE
Qty: 0 | Refills: 0 | Status: COMPLETED | OUTPATIENT
Start: 2017-02-16 | End: 2017-02-16

## 2017-02-16 RX ORDER — CEFTRIAXONE 500 MG/1
INJECTION, POWDER, FOR SOLUTION INTRAMUSCULAR; INTRAVENOUS
Qty: 0 | Refills: 0 | Status: DISCONTINUED | OUTPATIENT
Start: 2017-02-16 | End: 2017-02-16

## 2017-02-16 RX ADMIN — Medication 1 APPLICATION(S): at 22:33

## 2017-02-16 RX ADMIN — Medication 1 MILLIGRAM(S): at 12:45

## 2017-02-16 RX ADMIN — BRIMONIDINE TARTRATE 1 DROP(S): 2 SOLUTION/ DROPS OPHTHALMIC at 17:11

## 2017-02-16 RX ADMIN — PIPERACILLIN AND TAZOBACTAM 200 GRAM(S): 4; .5 INJECTION, POWDER, LYOPHILIZED, FOR SOLUTION INTRAVENOUS at 22:32

## 2017-02-16 RX ADMIN — Medication 40 MILLIEQUIVALENT(S): at 18:29

## 2017-02-16 RX ADMIN — Medication 1 TABLET(S): at 12:45

## 2017-02-16 RX ADMIN — Medication 250 MILLIGRAM(S): at 12:45

## 2017-02-16 RX ADMIN — Medication 250 MILLIGRAM(S): at 14:26

## 2017-02-16 RX ADMIN — PIPERACILLIN AND TAZOBACTAM 200 GRAM(S): 4; .5 INJECTION, POWDER, LYOPHILIZED, FOR SOLUTION INTRAVENOUS at 09:16

## 2017-02-16 RX ADMIN — Medication 100 GRAM(S): at 11:55

## 2017-02-16 RX ADMIN — Medication 1 TABLET(S): at 06:13

## 2017-02-16 RX ADMIN — POLYETHYLENE GLYCOL 3350 17 GRAM(S): 17 POWDER, FOR SOLUTION ORAL at 17:10

## 2017-02-16 RX ADMIN — PIPERACILLIN AND TAZOBACTAM 200 GRAM(S): 4; .5 INJECTION, POWDER, LYOPHILIZED, FOR SOLUTION INTRAVENOUS at 16:00

## 2017-02-16 RX ADMIN — QUETIAPINE FUMARATE 100 MILLIGRAM(S): 200 TABLET, FILM COATED ORAL at 22:32

## 2017-02-16 RX ADMIN — Medication 40 MILLIEQUIVALENT(S): at 12:45

## 2017-02-16 RX ADMIN — HEPARIN SODIUM 5000 UNIT(S): 5000 INJECTION INTRAVENOUS; SUBCUTANEOUS at 06:13

## 2017-02-16 RX ADMIN — DIVALPROEX SODIUM 500 MILLIGRAM(S): 500 TABLET, DELAYED RELEASE ORAL at 17:10

## 2017-02-16 RX ADMIN — DIVALPROEX SODIUM 500 MILLIGRAM(S): 500 TABLET, DELAYED RELEASE ORAL at 06:13

## 2017-02-16 RX ADMIN — Medication 100 MILLIGRAM(S): at 12:45

## 2017-02-16 NOTE — PROGRESS NOTE ADULT - PROBLEM SELECTOR PLAN 10
regular diet  IVF, d5w+bicarb @ 100cc/hr   monitor/ replete javier     Dispo: d/c to skilled care facility pending medical clearance     FULL CODE pt w/o angina /dyspnea, no evidence of ACS, decompensated CHF, arrythmia.   unable to assess METS as pt is primarily wheel chair bound  RCRI = II , pt is at intermediate risk for low-intermediate risk surgery.   ekg - w/o ischemic changes   plts 90s   please hold lovenox night prior to surgery

## 2017-02-16 NOTE — DIETITIAN INITIAL EVALUATION ADULT. - PHYSICAL APPEARANCE
Moderate muscle wasting noted in extremities. Thinning hair noted & poor dentition noted./underweight

## 2017-02-16 NOTE — ADVANCED PRACTICE NURSE CONSULT - RECOMMEDATIONS
MASD - cleanse with soap and water, apply zinc based moisture barrier cream twice daily and prn if wet or soiled.  Right heel DTI, right lower calf and left hip unstageable pressure ulcer - apply foam dressing every 3 days.  Left anterior thigh burn - cleanse with normal saline, apply Silvadene 1% cream twice daily, cover with 4x4 gauze and secure with paper tape.   Use z ynes pillow for turning and repositioning and z float heel protectors for offloading.  Discussed assessment and recommendations with Mary DE LA VEGA and house staff. MASD - cleanse with soap and water, apply zinc based moisture barrier cream twice daily and prn if wet or soiled.  Right heel DTI, right lower calf and left hip unstageable pressure ulcer - apply foam dressing every 3 days.  Left anterior thigh burn - cleanse with normal saline, apply Silvadene 1% cream, cover with 4x4 gauze and secure with paper tape twice daily.   Use z ynes pillow for turning and repositioning and z float heel protectors for offloading.  Discussed assessment and recommendations with Mary DE LA VEGA and house staff.

## 2017-02-16 NOTE — PROGRESS NOTE ADULT - PROBLEM SELECTOR PLAN 4
new DVT in right mid-distal femoral  - start Lovenox 50mg BID despite thrombocytopenia (platelet count increased to 93). Will use PC of 50 as threshold to stop if decreasing

## 2017-02-16 NOTE — PROGRESS NOTE ADULT - PROBLEM SELECTOR PLAN 1
dry gangrene of left 2nd digit. distal phalange black, necrotic, painful to touch. hx of extensive PVD with previous amputation of right middle finger. Patient to undergo amputation on Saturday morning by hand surgery pending medical clearance

## 2017-02-16 NOTE — DIETITIAN INITIAL EVALUATION ADULT. - OTHER INFO
Consult received to assess patient. Caretaker at bedside endorses a good appetite PTA & pt currently asking for food (NPO). States she takes MVI at home & eats well, however continuing to lose weight. Discussed adding ensure plus to diet, continuing MVI & considering vitamin D supplement as pt likely deficient. Care taker reports pt recently having difficulty swallowing large pills (she now cuts them in half). Suspected moderate malnutrition secondary to significant wt loss, low BMI, etc.

## 2017-02-16 NOTE — CHART NOTE - NSCHARTNOTEFT_GEN_A_CORE
Upon Nutritional Assessment by the Registered Dietitian your patient was determined to meet criteria / has evidence of the following diagnosis/diagnoses:          [ ]  Mild Protein Calorie Malnutrition        [ X]  Moderate Protein Calorie Malnutrition        [ ] Severe Protein Calorie Malnutrition        [ ] Unspecified Protein Calorie Malnutrition        [ ] Underweight / BMI <19        [ ] Morbid Obesity / BMI > 40      Findings as based on:  •  Comprehensive nutrition assessment and consultation  •  Calorie counts (nutrient intake analysis)  •  Food acceptance and intake status from observations by staff  •  Follow up  •  Patient education  •  Intervention secondary to interdisciplinary rounds  •   concerns      Treatment:    The following diet has been recommended:      PROVIDER Section:     By signing this assessment you are acknowledging and agree with the diagnosis/diagnoses assigned by the Registered Dietitian    Comments:

## 2017-02-16 NOTE — CONSULT NOTE ADULT - ATTENDING COMMENTS
No pain.  Abd soft, distended, NT  AXR, CT reviewed, consistent with colon above liver. No free air.    Recommend bowel regime.  Reconsult as needed

## 2017-02-16 NOTE — PROGRESS NOTE ADULT - PROBLEM SELECTOR PLAN 10
regular diet  IVF, d5w+bicarb @ 100cc/hr   monitor/ replete javier     Dispo: d/c to skilled care facility pending medical clearance     FULL CODE

## 2017-02-16 NOTE — PROGRESS NOTE ADULT - PROBLEM SELECTOR PLAN 8
c/w home Depakote and Seroquel    #Abdominal Distension: nontender, no guarding. Xray with dilated loops of bowel. Patient to be given enema and started on aggressive bowel regimen.   - f/u CT scan c/w home Depakote and Seroquel

## 2017-02-16 NOTE — CONSULT NOTE ADULT - SUBJECTIVE AND OBJECTIVE BOX
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 Patient noted her left 2nd finger to be black, discolored, causing her excruciating soreness and pain. Pain characterized 9/10, non radiating, slightly improved with Advil with no motor or sensation deficits. Patient says she takes 6 Advil tablets/ day since november. Patient reports last BM was 2 days ago with bright red blood in her stool, unclear duration of bloody stools and she states she had a colonoscopy " a few months ago", though history is unreliable. Patient also complains of painful ulcers on her buttocks, which has been bleeding for 6 weeks. Unclear duration of ulcers or current ulcer management. Per medical record/ aid, patient has lost > 40 lbs in the last few months. ROS negative for headaches, fevers, blurry vision, chest pain, or abdominal pain. Patient states she has a home health aid that comes daily. She ambulates with a walker.   General surgery was consulted for questionable free air seen on AM CXR.       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  PVD (peripheral vascular disease)  Glaucoma  Bipolar disorder  Schizophrenia  DM (diabetes mellitus)  H/O eye surgery  S/P exploratory laparotomy  H/O knee surgery  S/P lung surgery, follow-up exam      MEDICATIONS  (STANDING):  diVALproex DR 500milliGRAM(s) Oral two times a day  QUEtiapine 100milliGRAM(s) Oral at bedtime  brimonidine 0.2% Ophthalmic Solution 1Drop(s) Both EYES two times a day  insulin lispro (HumaLOG) corrective regimen sliding scale  SubCutaneous Before meals and at bedtime  heparin  Injectable 5000Unit(s) SubCutaneous every 12 hours  dextrose 5% 1000milliLiter(s) IV Continuous <Continuous>  thiamine 100milliGRAM(s) Oral daily  folic acid 1milliGRAM(s) Oral daily  multivitamin 1Tablet(s) Oral daily  ascorbic acid 250milliGRAM(s) Oral daily  magnesium sulfate  IVPB 1Gram(s) IV Intermittent once  potassium chloride   Powder 40milliEquivalent(s) Oral once  piperacillin/tazobactam IVPB. 3.375Gram(s) IV Intermittent every 6 hours  enoxaparin Injectable 50milliGRAM(s) SubCutaneous two times a day  vancomycin  IVPB  IV Intermittent   polyethylene glycol 3350 17Gram(s) Oral two times a day    MEDICATIONS  (PRN):  acetaminophen   Tablet. 650milliGRAM(s) Oral every 6 hours PRN Moderate Pain (4 - 6)      Allergies    No Known Allergies    Intolerances        SOCIAL HISTORY:    FAMILY HISTORY:      Vital Signs Last 24 Hrs  T(C): 36.4, Max: 36.7 (02-15 @ 21:02)  T(F): 97.5, Max: 98 (02-15 @ 21:02)  HR: 74 (72 - 84)  BP: 127/62 (127/58 - 163/75)  BP(mean): --  RR: 16 (16 - 18)  SpO2: 100% (92% - 100%)        LABS:                        7.6    7.3   )-----------( 93       ( 15 Feb 2017 18:56 )             23.8     2017 06:18    152    |  121    |  35     ----------------------------<  126    3.7     |  17     |  1.07     Ca    7.5        2017 06:18  Phos  3.1       2017 06:18  Mg     1.6       2017 06:18    TPro  6.4    /  Alb  2.1    /  TBili  0.2    /  DBili  x      /  AST  9      /  ALT  10     /  AlkPhos  90     15 Feb 2017 18:56    PT/INR - ( 15 Feb 2017 18:56 )   PT: 20.8 sec;   INR: 1.86          PTT - ( 15 Feb 2017 18:56 )  PTT:32.5 sec  Urinalysis Basic - ( 2017 02:18 )    Color: Yellow / Appearance: Hazy / S.020 / pH: x  Gluc: x / Ketone: NEGATIVE  / Bili: NEGATIVE / Urobili: 0.2 E.U./dL   Blood: x / Protein: 30 mg/dL / Nitrite: POSITIVE   Leuk Esterase: Large / RBC: Many /HPF / WBC Many /HPF   Sq Epi: x / Non Sq Epi: Rare /HPF / Bacteria: Present /HPF        RADIOLOGY & ADDITIONAL STUDIES: HPI:  72 y/o female (poor historian) with pmhx of bipolar dx, schizophrenia, parkinson's, DM, PVD with suspected Raynaud's s/p right middle finger amputation (10/08/16) presents with left 2nd digit pain x 1 month. Patient noted her left 2nd finger to be black, discolored, causing her excruciating soreness and pain. Pain characterized 9/10, non radiating, slightly improved with Advil with no motor or sensation deficits. Patient says she takes 6 Advil tablets/ day since november. Patient reports last BM was 2 days ago with bright red blood in her stool, unclear duration of bloody stools and she states she had a colonoscopy " a few months ago", though history is unreliable. Patient also complains of painful ulcers on her buttocks, which has been bleeding for 6 weeks. Unclear duration of ulcers or current ulcer management. Per medical record/ aid, patient has lost > 40 lbs in the last few months. ROS negative for headaches, fevers, blurry vision, chest pain, or abdominal pain. Patient states she has a home health aid that comes daily. She ambulates with a walker.   General surgery was consulted for questionable free air seen on AM CXR.       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  PVD (peripheral vascular disease)  Glaucoma  Bipolar disorder  Schizophrenia  DM (diabetes mellitus)  H/O eye surgery  S/P exploratory laparotomy  H/O knee surgery  S/P lung surgery, follow-up exam      MEDICATIONS  (STANDING):  diVALproex DR 500milliGRAM(s) Oral two times a day  QUEtiapine 100milliGRAM(s) Oral at bedtime  brimonidine 0.2% Ophthalmic Solution 1Drop(s) Both EYES two times a day  insulin lispro (HumaLOG) corrective regimen sliding scale  SubCutaneous Before meals and at bedtime  heparin  Injectable 5000Unit(s) SubCutaneous every 12 hours  dextrose 5% 1000milliLiter(s) IV Continuous <Continuous>  thiamine 100milliGRAM(s) Oral daily  folic acid 1milliGRAM(s) Oral daily  multivitamin 1Tablet(s) Oral daily  ascorbic acid 250milliGRAM(s) Oral daily  magnesium sulfate  IVPB 1Gram(s) IV Intermittent once  potassium chloride   Powder 40milliEquivalent(s) Oral once  piperacillin/tazobactam IVPB. 3.375Gram(s) IV Intermittent every 6 hours  enoxaparin Injectable 50milliGRAM(s) SubCutaneous two times a day  vancomycin  IVPB  IV Intermittent   polyethylene glycol 3350 17Gram(s) Oral two times a day    MEDICATIONS  (PRN):  acetaminophen   Tablet. 650milliGRAM(s) Oral every 6 hours PRN Moderate Pain (4 - 6)      Allergies    No Known Allergies    Intolerances        SOCIAL HISTORY:    FAMILY HISTORY:      Vital Signs Last 24 Hrs  T(C): 36.4, Max: 36.7 (02-15 @ 21:02)  T(F): 97.5, Max: 98 (02-15 @ 21:02)  HR: 74 (72 - 84)  BP: 127/62 (127/58 - 163/75)  BP(mean): --  RR: 16 (16 - 18)  SpO2: 100% (92% - 100%)        LABS:                        7.6    7.3   )-----------( 93       ( 15 Feb 2017 18:56 )             23.8     2017 06:18    152    |  121    |  35     ----------------------------<  126    3.7     |  17     |  1.07     Ca    7.5        2017 06:18  Phos  3.1       2017 06:18  Mg     1.6       2017 06:18    TPro  6.4    /  Alb  2.1    /  TBili  0.2    /  DBili  x      /  AST  9      /  ALT  10     /  AlkPhos  90     15 Feb 2017 18:56    PT/INR - ( 15 Feb 2017 18:56 )   PT: 20.8 sec;   INR: 1.86          PTT - ( 15 Feb 2017 18:56 )  PTT:32.5 sec  Urinalysis Basic - ( 2017 02:18 )    Color: Yellow / Appearance: Hazy / S.020 / pH: x  Gluc: x / Ketone: NEGATIVE  / Bili: NEGATIVE / Urobili: 0.2 E.U./dL   Blood: x / Protein: 30 mg/dL / Nitrite: POSITIVE   Leuk Esterase: Large / RBC: Many /HPF / WBC Many /HPF   Sq Epi: x / Non Sq Epi: Rare /HPF / Bacteria: Present /HPF        RADIOLOGY & ADDITIONAL STUDIES:

## 2017-02-16 NOTE — PROGRESS NOTE ADULT - PROBLEM SELECTOR PLAN 2
95.7 this morning. No other SIRS criteria. 1/4 SIRS. Sources include dry gangrenous finger, +UA, multiple ulcers. TSH normal. Will give warming blanket. Will empirically treat with IV Vancomycin + Zosyn  - f/u ESR, CRP concern for infection  TSH normal  f/u bld cx  c/w Vancomycin + Zosyn  - f/u ESR, CRP

## 2017-02-16 NOTE — PROGRESS NOTE ADULT - PROBLEM SELECTOR PLAN 6
right heel, left anterior thigh, sacral/ decubiti ulcer  - f/'u wound care rec's  possible source of infection   c/w Vanc + Zosyn

## 2017-02-16 NOTE — PROGRESS NOTE ADULT - PROBLEM SELECTOR PLAN 5
> 40 lbs in past 3 months. Likely malnutrition induced as patient cachectic with temporal wasting on exam. Will f/u nutrition recommendations and consider malignancy workup

## 2017-02-16 NOTE — PROGRESS NOTE ADULT - PROBLEM SELECTOR PLAN 1
dry gangrene of left 2nd digit. distal phalange black, necrotic, painful to touch. hx of extensive PVD with previous amputation of right middle finger. Patient to undergo amputation on Saturday morning by hand surgery pending medical clearance left 2nd digit  will c/w vanc/zosyn  needs partial amputation- hand surgery consulted (Dr Jarrett)

## 2017-02-16 NOTE — PROGRESS NOTE ADULT - PROBLEM SELECTOR PLAN 4
new DVT in right mid-distal femoral  - start Lovenox 50mg BID despite thrombocytopenia (platelet count increased to 93). Will use PC of 50 as threshold to stop if decreasing right mid-distal femoral  on therapeutic  Lovenox

## 2017-02-16 NOTE — PROGRESS NOTE ADULT - PROBLEM SELECTOR PLAN 2
95.7 this morning. No other SIRS criteria. 1/4 SIRS. Sources include dry gangrenous finger, +UA, multiple ulcers. TSH normal. Will give warming blanket. Will empirically treat with IV Vancomycin + Zosyn  - f/u ESR, CRP

## 2017-02-16 NOTE — PROGRESS NOTE ADULT - ASSESSMENT
70 y/o with extensive PVD, psychiatric illness, multiple ulcers, presents with finger pain 2/2 dry gangrene.

## 2017-02-16 NOTE — PROGRESS NOTE ADULT - PROBLEM SELECTOR PLAN 5
> 40 lbs in past 3 months. Likely malnutrition induced as patient cachectic with temporal wasting on exam. Will f/u nutrition recommendations and consider malignancy workup c/w local wound care

## 2017-02-16 NOTE — PROGRESS NOTE ADULT - PROBLEM SELECTOR PLAN 8
c/w home Depakote and Seroquel    #Abdominal Distension: nontender, no guarding. Xray with dilated loops of bowel. Patient to be given enema and started on aggressive bowel regimen.   - f/u CT scan

## 2017-02-16 NOTE — PROGRESS NOTE ADULT - SUBJECTIVE AND OBJECTIVE BOX
Ortho Preop Note    Patient is a 71y old  Female who presents with a chief complaint of Finger pain (15 Feb 2017 17:28)    Diagnosis: Left index finger distal gangrene  Procedure: Partial amputation of left index finger  Surgeon: Dr. Jarrett                          7.6    7.3   )-----------( 93       ( 15 Feb 2017 18:56 )             23.8     2017 06:18    152    |  121    |  35     ----------------------------<  126    3.7     |  17     |  1.07     Ca    7.5        2017 06:18  Phos  3.1       2017 06:18  Mg     1.6       2017 06:18    TPro  6.4    /  Alb  2.1    /  TBili  0.2    /  DBili  x      /  AST  9      /  ALT  10     /  AlkPhos  90     15 Feb 2017 18:56    PT/INR - ( 15 Feb 2017 18:56 )   PT: 20.8 sec;   INR: 1.86          PTT - ( 15 Feb 2017 18:56 )  PTT:32.5 sec  Urinalysis Basic - ( 2017 02:18 )    Color: Yellow / Appearance: Hazy / S.020 / pH: x  Gluc: x / Ketone: NEGATIVE  / Bili: NEGATIVE / Urobili: 0.2 E.U./dL   Blood: x / Protein: 30 mg/dL / Nitrite: POSITIVE   Leuk Esterase: Large / RBC: Many /HPF / WBC Many /HPF   Sq Epi: x / Non Sq Epi: Rare /HPF / Bacteria: Present /HPF        [x] Type & Screen  [x] CBC  [x] BMP  [x] PT/PTT/INR  [x] Urinalysis  [x] Chest X-ray  [x] EKG  [ ] NPO/IVF  [ ] Consent  [ ] Clearance (primary team)  [ ] Added on to OR Schedule  [ ] Anti-coagulation held       Assessment & Plan:  71yFemale with gangrene of left index finger  -For OR 17 vs. 17    Ortho Pager [phone] Ortho Preop Note    Patient is a 71y old  Female who presents with a chief complaint of Finger pain (15 Feb 2017 17:28)    Diagnosis: Left index finger distal gangrene  Procedure: Partial amputation of left index finger  Surgeon: Dr. Jarrett                          7.6    7.3   )-----------( 93       ( 15 Feb 2017 18:56 )             23.8     2017 06:18    152    |  121    |  35     ----------------------------<  126    3.7     |  17     |  1.07     Ca    7.5        2017 06:18  Phos  3.1       2017 06:18  Mg     1.6       2017 06:18    TPro  6.4    /  Alb  2.1    /  TBili  0.2    /  DBili  x      /  AST  9      /  ALT  10     /  AlkPhos  90     15 Feb 2017 18:56    PT/INR - ( 15 Feb 2017 18:56 )   PT: 20.8 sec;   INR: 1.86          PTT - ( 15 Feb 2017 18:56 )  PTT:32.5 sec  Urinalysis Basic - ( 2017 02:18 )    Color: Yellow / Appearance: Hazy / S.020 / pH: x  Gluc: x / Ketone: NEGATIVE  / Bili: NEGATIVE / Urobili: 0.2 E.U./dL   Blood: x / Protein: 30 mg/dL / Nitrite: POSITIVE   Leuk Esterase: Large / RBC: Many /HPF / WBC Many /HPF   Sq Epi: x / Non Sq Epi: Rare /HPF / Bacteria: Present /HPF        [x] Type & Screen  [x] CBC  [x] BMP  [x] PT/PTT/INR  [x] Urinalysis  [x] Chest X-ray  [x] EKG  [ ] NPO/IVF  [ ] Consent  [ ] Clearance (primary team)  [ ] Added on to OR Schedule  [N/A] Anti-coagulation held  [N/A] MRSA/MSSA swab       Assessment & Plan:  71yFemale with gangrene of left index finger  -For OR 17    Ortho Pager [phone] Ortho Preop Note    Patient is a 71y old  Female who presents with a chief complaint of Finger pain (15 Feb 2017 17:28)    Diagnosis: Left index finger distal gangrene  Procedure: Partial amputation of left index finger  Surgeon: Dr. Jarrett                          7.6    7.3   )-----------( 93       ( 15 Feb 2017 18:56 )             23.8     2017 06:18    152    |  121    |  35     ----------------------------<  126    3.7     |  17     |  1.07     Ca    7.5        2017 06:18  Phos  3.1       2017 06:18  Mg     1.6       2017 06:18    TPro  6.4    /  Alb  2.1    /  TBili  0.2    /  DBili  x      /  AST  9      /  ALT  10     /  AlkPhos  90     15 Feb 2017 18:56    PT/INR - ( 15 Feb 2017 18:56 )   PT: 20.8 sec;   INR: 1.86          PTT - ( 15 Feb 2017 18:56 )  PTT:32.5 sec  Urinalysis Basic - ( 2017 02:18 )    Color: Yellow / Appearance: Hazy / S.020 / pH: x  Gluc: x / Ketone: NEGATIVE  / Bili: NEGATIVE / Urobili: 0.2 E.U./dL   Blood: x / Protein: 30 mg/dL / Nitrite: POSITIVE   Leuk Esterase: Large / RBC: Many /HPF / WBC Many /HPF   Sq Epi: x / Non Sq Epi: Rare /HPF / Bacteria: Present /HPF        [x] Type & Screen  [x] CBC  [x] BMP  [x] PT/PTT/INR  [x] Urinalysis  [x] Chest X-ray  [x] EKG  [ ] NPO/IVF  [ ] Consent  [ ] Clearance (primary team)  [ ] Added on to OR Schedule  [ ] Anti-coagulation held     Assessment & Plan:  71yFemale with gangrene of left index finger  -For OR 17    Ortho Pager [phone] Ortho Preop Note    Patient is a 71y old  Female who presents with a chief complaint of Finger pain (15 Feb 2017 17:28)    Diagnosis: Left index finger distal gangrene  Procedure: Partial amputation of left index finger  Surgeon: Dr. Jarrett                          7.6    7.3   )-----------( 93       ( 15 Feb 2017 18:56 )             23.8     2017 06:18    152    |  121    |  35     ----------------------------<  126    3.7     |  17     |  1.07     Ca    7.5        2017 06:18  Phos  3.1       2017 06:18  Mg     1.6       2017 06:18    TPro  6.4    /  Alb  2.1    /  TBili  0.2    /  DBili  x      /  AST  9      /  ALT  10     /  AlkPhos  90     15 Feb 2017 18:56    PT/INR - ( 15 Feb 2017 18:56 )   PT: 20.8 sec;   INR: 1.86          PTT - ( 15 Feb 2017 18:56 )  PTT:32.5 sec  Urinalysis Basic - ( 2017 02:18 )    Color: Yellow / Appearance: Hazy / S.020 / pH: x  Gluc: x / Ketone: NEGATIVE  / Bili: NEGATIVE / Urobili: 0.2 E.U./dL   Blood: x / Protein: 30 mg/dL / Nitrite: POSITIVE   Leuk Esterase: Large / RBC: Many /HPF / WBC Many /HPF   Sq Epi: x / Non Sq Epi: Rare /HPF / Bacteria: Present /HPF        [x] Type & Screen  [x] CBC  [x] BMP  [x] PT/PTT/INR  [x] Urinalysis  [x] Chest X-ray  [x] EKG  [ ] NPO/IVF  [x] Consent  [ ] Clearance (primary team)  [ ] Added on to OR Schedule  [ ] Anti-coagulation held (Lovenox)    Assessment & Plan:  71yFemale with gangrene of left index finger  -For OR 17    Ortho Pager [phone]

## 2017-02-16 NOTE — DIETITIAN INITIAL EVALUATION ADULT. - PROBLEM SELECTOR PLAN 7
patient reports a hx of DM, not on any home anti-glycemics. 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 Will place on moderate dose sliding scale and adjust need for basal insulin based off requirements. obtain HbA1c

## 2017-02-16 NOTE — DIETITIAN INITIAL EVALUATION ADULT. - PROBLEM SELECTOR PLAN 10
#HyperNatremia: 153 on admission. likely 2/2 decreased PO intake. FWD 2.4L. s/p 2 L NS bolus in ED. will c/w 1/5 NS @ 100cc/hr     monitor/ replete lytes PRN   regular diet     Dispo: pending clinical improvement   PT consult    FULL CODE

## 2017-02-16 NOTE — PROGRESS NOTE ADULT - PROBLEM SELECTOR PLAN 7
Likely iron deficiency anemia  Low suspicion for GIB  transfuse if Hb < 7   will trend suspect due to chronic disease  Low suspicion for GIB  transfuse if Hb < 7   will trend cbc

## 2017-02-16 NOTE — PROGRESS NOTE ADULT - SUBJECTIVE AND OBJECTIVE BOX
Patient is a 62y old  Female who presents with a chief complaint of weakness (13 Feb 2017 06:04)    INTERVAL HPI/OVERNIGHT EVENTS:  c/o left 2nd finger tip pain  no abd pain  no vomiting  abdomen is bloated -- but this is a chronic issue  (+) soft BMs  pt was hypothermic 95.7f (rectally)      ( -  )fevers/chills  ( - ) dyspnea  (  - ) cough  (  - ) chest pain  (  - ) palpatations  ( - ) dizziness/lightheadedness  (  - ) nausea/vomiting  (  - ) abd pain  (  - ) diarrhea  (  - ) melena  (  - ) hematochezia  (  - ) dysuria  ( - ) hematuria  (  - ) leg swelling  ( -) calf tenderness  (  - ) motor weakness  (  - ) extremity numbness  ( - ) back pain  ( + ) tolerating POs       ROS: 12 point review of systems otherwise negative    MEDICATIONS  (STANDING):  insulin lispro (HumaLOG) corrective regimen sliding scale  SubCutaneous Before meals and at bedtime  pantoprazole  Injectable 40milliGRAM(s) IV Push two times a day  methylPREDNISolone sodium succinate Injectable 40milliGRAM(s) IV Push once  ALBUTerol/ipratropium for Nebulization 3milliLiter(s) Nebulizer every 4 hours  piperacillin/tazobactam IVPB. 3.375Gram(s) IV Intermittent every 6 hours  lisinopril 5milliGRAM(s) Oral daily  heparin  Infusion 2000Unit(s)/Hr IV Continuous <Continuous>  polyethylene glycol/electrolyte Solution. 4000milliLiter(s) Oral once    MEDICATIONS  (PRN):    Allergies    iodinated radiocontrast agents (Unknown)    Intolerances        Vital Signs Last 24 Hrs  T(C): 37.2, Max: 37.5 (02-15 @ 15:33)  T(F): 99, Max: 99.5 (02-15 @ 15:33)  HR: 95 (71 - 102)  BP: 123/62 (120/81 - 166/82)  BP(mean): --  RR: 20 (20 - 22)  SpO2: 95% (95% - 100%)  CAPILLARY BLOOD GLUCOSE  127 (16 Feb 2017 13:17)  140 (16 Feb 2017 09:46)  215 (15 Feb 2017 21:23)  285 (15 Feb 2017 20:07)    I&O's Summary  I & Os for 24h ending 16 Feb 2017 07:00  =============================================  IN: 396 ml / OUT: 0 ml / NET: 396 ml    I & Os for current day (as of 16 Feb 2017 13:28)  =============================================  IN: 20 ml / OUT: 0 ml / NET: 20 ml    Physical Exam:    Daily     Daily   General:  Well appearing, (+) alopecia  HEENT:  Nonicteric, right eye pupil reactive, left eye (+) cataract, neck supple  CV: S1S2 nml,  RRR,  Lungs:  CTA B/L, no wheezes, rhonchi, rales  Abdomen:  (+) hypoactive bowel sounds, Soft, non-tender, non distended  Extremities:  2+ DP pulses b/l, no extremity clubbing/cyanosis/ edema  Back: no CVA tenderness, no midline vertebral tenderness  Skin:  Warm and dry, no rashes  :  No hubbard  Neuro:  AAOx3,  CN II-XII grossly intact ,   5/5 str all 4 extremities, sensation intact b/l, ( -) F to N dysmetria (-) dysdiadochokinesia  No Restraints    LABS:                        8.1    14.5  )-----------( 375      ( 16 Feb 2017 07:58 )             27.5     16 Feb 2017 07:58    142    |  102    |  13     ----------------------------<  90     3.8     |  30     |  0.92     Ca    8.6        16 Feb 2017 07:58  Mg     2.2       16 Feb 2017 07:58        PT/INR - ( 15 Feb 2017 11:25 )   PT: 15.1 sec;   INR: 1.36          PTT - ( 16 Feb 2017 03:23 )  PTT:147.2 sec    CARDIAC MARKERS ( 15 Feb 2017 11:25 )  <0.015 ng/mL / x     / 97 U/L / x     / 1.1 ng/mL               hosp Patient is a 62y old  Female who presents with a chief complaint of weakness (13 Feb 2017 06:04)    INTERVAL HPI/OVERNIGHT EVENTS:  c/o left 2nd finger tip pain  no abd pain  no vomiting  abdomen is bloated -- but this is a chronic issue  (+) soft BMs  pt was hypothermic 95.7f (rectally)      ( -  )fevers/chills  ( - ) dyspnea  (  - ) cough  (  - ) chest pain  (  - ) palpatations  ( - ) dizziness/lightheadedness  (  - ) nausea/vomiting  (  - ) abd pain  (  - ) diarrhea  (  - ) melena  (  - ) hematochezia  (  - ) dysuria  ( - ) hematuria  (  - ) leg swelling  ( -) calf tenderness  (  - ) motor weakness  (  - ) extremity numbness  ( - ) back pain  ( + ) tolerating POs       ROS: 12 point review of systems otherwise negative    MEDICATIONS  (STANDING):  insulin lispro (HumaLOG) corrective regimen sliding scale  SubCutaneous Before meals and at bedtime  pantoprazole  Injectable 40milliGRAM(s) IV Push two times a day  methylPREDNISolone sodium succinate Injectable 40milliGRAM(s) IV Push once  ALBUTerol/ipratropium for Nebulization 3milliLiter(s) Nebulizer every 4 hours  piperacillin/tazobactam IVPB. 3.375Gram(s) IV Intermittent every 6 hours  lisinopril 5milliGRAM(s) Oral daily  heparin  Infusion 2000Unit(s)/Hr IV Continuous <Continuous>  polyethylene glycol/electrolyte Solution. 4000milliLiter(s) Oral once    MEDICATIONS  (PRN):    Allergies    iodinated radiocontrast agents (Unknown)    Intolerances        Vital Signs Last 24 Hrs  T(C): 37.2, Max: 37.5 (02-15 @ 15:33)  T(F): 99, Max: 99.5 (02-15 @ 15:33)  HR: 95 (71 - 102)  BP: 123/62 (120/81 - 166/82)  BP(mean): --  RR: 20 (20 - 22)  SpO2: 95% (95% - 100%)  CAPILLARY BLOOD GLUCOSE  127 (16 Feb 2017 13:17)  140 (16 Feb 2017 09:46)  215 (15 Feb 2017 21:23)  285 (15 Feb 2017 20:07)    I&O's Summary  I & Os for 24h ending 16 Feb 2017 07:00  =============================================  IN: 396 ml / OUT: 0 ml / NET: 396 ml    I & Os for current day (as of 16 Feb 2017 13:28)  =============================================  IN: 20 ml / OUT: 0 ml / NET: 20 ml    Physical Exam:    Daily     Daily   General:  Well appearing, (+) alopecia  HEENT:  Nonicteric, right eye pupil reactive, left eye (+) cataract, neck supple  CV: S1S2 nml,  RRR,  Lungs:  CTA B/L, no wheezes, rhonchi, rales  Abdomen:  (+) hypoactive bowel sounds, Soft, non-tender,  distended  Extremities:  2+ DP pulses b/l, no extremity clubbing/cyanosis/ edema  Back: no CVA tenderness, no midline vertebral tenderness  Skin:   Warm and dry, no rashes  :  No hubbard  Neuro:  AAOx3,  CN II-XII grossly intact ,   4/5 str all 4 extremities, sensation intact b/l  No Restraints    LABS:                        8.1    14.5  )-----------( 375      ( 16 Feb 2017 07:58 )             27.5     16 Feb 2017 07:58    142    |  102    |  13     ----------------------------<  90     3.8     |  30     |  0.92     Ca    8.6        16 Feb 2017 07:58  Mg     2.2       16 Feb 2017 07:58        PT/INR - ( 15 Feb 2017 11:25 )   PT: 15.1 sec;   INR: 1.36          PTT - ( 16 Feb 2017 03:23 )  PTT:147.2 sec    CARDIAC MARKERS ( 15 Feb 2017 11:25 )  <0.015 ng/mL / x     / 97 U/L / x     / 1.1 ng/mL               hosp Patient is a 62y old  Female who presents with a chief complaint of weakness (13 Feb 2017 06:04)    INTERVAL HPI/OVERNIGHT EVENTS:  c/o left 2nd finger tip pain  no abd pain  no vomiting  abdomen is bloated -- but this is a chronic issue  (+) soft BMs  pt was hypothermic 95.7f (rectally)      ( -  )fevers/chills  ( - ) dyspnea  (  - ) cough  (  - ) chest pain  (  - ) palpatations  ( - ) dizziness/lightheadedness  (  - ) nausea/vomiting  (  - ) abd pain  (  - ) diarrhea  (  - ) melena  (  - ) hematochezia  (  - ) dysuria  ( - ) hematuria  (  - ) leg swelling  ( -) calf tenderness  (  - ) motor weakness  (  - ) extremity numbness  ( - ) back pain  ( + ) tolerating POs       ROS: 12 point review of systems otherwise negative    MEDICATIONS  (STANDING):  insulin lispro (HumaLOG) corrective regimen sliding scale  SubCutaneous Before meals and at bedtime  pantoprazole  Injectable 40milliGRAM(s) IV Push two times a day  methylPREDNISolone sodium succinate Injectable 40milliGRAM(s) IV Push once  ALBUTerol/ipratropium for Nebulization 3milliLiter(s) Nebulizer every 4 hours  piperacillin/tazobactam IVPB. 3.375Gram(s) IV Intermittent every 6 hours  lisinopril 5milliGRAM(s) Oral daily  heparin  Infusion 2000Unit(s)/Hr IV Continuous <Continuous>  polyethylene glycol/electrolyte Solution. 4000milliLiter(s) Oral once    MEDICATIONS  (PRN):    Allergies    iodinated radiocontrast agents (Unknown)    Intolerances        Vital Signs Last 24 Hrs  T(C): 37.2, Max: 37.5 (02-15 @ 15:33)  T(F): 99, Max: 99.5 (02-15 @ 15:33)  HR: 95 (71 - 102)  BP: 123/62 (120/81 - 166/82)  BP(mean): --  RR: 20 (20 - 22)  SpO2: 95% (95% - 100%)  CAPILLARY BLOOD GLUCOSE  127 (16 Feb 2017 13:17)  140 (16 Feb 2017 09:46)  215 (15 Feb 2017 21:23)  285 (15 Feb 2017 20:07)    I&O's Summary  I & Os for 24h ending 16 Feb 2017 07:00  =============================================  IN: 396 ml / OUT: 0 ml / NET: 396 ml    I & Os for current day (as of 16 Feb 2017 13:28)  =============================================  IN: 20 ml / OUT: 0 ml / NET: 20 ml    Physical Exam:    Daily     Daily   General:  Well appearing, (+) alopecia  HEENT:  Nonicteric, right eye pupil reactive, left eye (+) cataract, neck supple  CV: S1S2 nml,  RRR,  Lungs:  CTA B/L, no wheezes, rhonchi, rales  Abdomen:  (+) hypoactive bowel sounds, Soft, non-tender,  distended  Extremities:  2+ DP pulses b/l, no extremity clubbing/cyanosis/ edema  Back: no CVA tenderness, no midline vertebral tenderness  Skin:   stage ii-iii sacral decubiti, stage III ulcer on left anterior thigh  :  No hubbard  Neuro:  AAOx3,  CN II-XII grossly intact ,   4/5 str all 4 extremities, sensation intact b/l  No Restraints    LABS:                        8.1    14.5  )-----------( 375      ( 16 Feb 2017 07:58 )             27.5     16 Feb 2017 07:58    142    |  102    |  13     ----------------------------<  90     3.8     |  30     |  0.92     Ca    8.6        16 Feb 2017 07:58  Mg     2.2       16 Feb 2017 07:58        PT/INR - ( 15 Feb 2017 11:25 )   PT: 15.1 sec;   INR: 1.36          PTT - ( 16 Feb 2017 03:23 )  PTT:147.2 sec    CARDIAC MARKERS ( 15 Feb 2017 11:25 )  <0.015 ng/mL / x     / 97 U/L / x     / 1.1 ng/mL               hosp

## 2017-02-16 NOTE — DIETITIAN INITIAL EVALUATION ADULT. - NS AS NUTRI INTERV COLLABORAT
Consider SLP evaluation for S&S prior to starting a PO diet given advanced age & c/o difficulty swallowing

## 2017-02-16 NOTE — PROGRESS NOTE ADULT - PROBLEM SELECTOR PLAN 6
right heel, left anterior thigh, sacral/ decubiti ulcer  - f/'u wound care rec's  possible source of infection   c/w Vanc + Zosyn 20 lb wt loss in 5-6  months  unsure if it is an issue with access to food  vs underlying chronic dz or malignancy.   will need outpt age appropriate cancer screening.   supplement diet w/ ensure

## 2017-02-16 NOTE — PROGRESS NOTE ADULT - SUBJECTIVE AND OBJECTIVE BOX
INTERVAL HPI/OVERNIGHT EVENTS: patient seen and examined. overnight patient became hypothermic to 95.7. Given heating blanket. transitioned fluids from 1/2 NS to d5w+bicarb additives. 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 no lymphadenopathy   Respiratory: CTA b/l no w/r/c   Cardiovascular: RRR, S1, S2, no M/R/G   Gastrointestinal: + distended. nontender, no guarding   Extremities: dry, multiple ulcers on right heel, left ant thigh. mass on right thigh.   Vascular: 1+ DP pulse on right, 2+ on left   Neurological: AAOx3  Musculoskeletal: limited ROM  Back: sacral/ decubitus ulcer stage 2-3     MEDICATIONS  (STANDING):  diVALproex DR 500milliGRAM(s) Oral two times a day  QUEtiapine 100milliGRAM(s) Oral at bedtime  brimonidine 0.2% Ophthalmic Solution 1Drop(s) Both EYES two times a day  insulin lispro (HumaLOG) corrective regimen sliding scale  SubCutaneous Before meals and at bedtime  dextrose 5% 1000milliLiter(s) IV Continuous <Continuous>  thiamine 100milliGRAM(s) Oral daily  folic acid 1milliGRAM(s) Oral daily  multivitamin 1Tablet(s) Oral daily  ascorbic acid 250milliGRAM(s) Oral daily  piperacillin/tazobactam IVPB. 3.375Gram(s) IV Intermittent every 6 hours  vancomycin  IVPB  IV Intermittent   polyethylene glycol 3350 17Gram(s) Oral two times a day  vancomycin  IVPB 750milliGRAM(s) IV Intermittent once  enoxaparin Injectable 50milliGRAM(s) SubCutaneous two times a day    MEDICATIONS  (PRN):  acetaminophen   Tablet. 650milliGRAM(s) Oral every 6 hours PRN Moderate Pain (4 - 6)      Allergies    No Known Allergies    Intolerances        LABS:                        7.6    7.3   )-----------( 93       ( 15 Feb 2017 18:56 )             23.8     2017 06:18    152    |  121    |  35     ----------------------------<  126    3.7     |  17     |  1.07     Ca    7.5        2017 06:18  Phos  3.1       2017 06:18  Mg     1.6       2017 06:18    TPro  6.4    /  Alb  2.1    /  TBili  0.2    /  DBili  x      /  AST  9      /  ALT  10     /  AlkPhos  90     15 Feb 2017 18:56    PT/INR - ( 15 Feb 2017 18:56 )   PT: 20.8 sec;   INR: 1.86          PTT - ( 15 Feb 2017 18:56 )  PTT:32.5 sec  Urinalysis Basic - ( 2017 02:18 )    Color: Yellow / Appearance: Hazy / S.020 / pH: x  Gluc: x / Ketone: NEGATIVE  / Bili: NEGATIVE / Urobili: 0.2 E.U./dL   Blood: x / Protein: 30 mg/dL / Nitrite: POSITIVE   Leuk Esterase: Large / RBC: Many /HPF / WBC Many /HPF   Sq Epi: x / Non Sq Epi: Rare /HPF / Bacteria: Present /HPF        RADIOLOGY & ADDITIONAL TESTS:  XR ABDOMEN 1 VIEW PORT URGENT   INTERPRETATION:  Resident preliminary report    ABDOMEN dated 2/15/2017 10:22 PM    Indication: Abdominal distention.    AP view of the abdomen demonstrate multiple dilated loops of small bowel   measuring up to 5.2 cm. No significant air-fluid levels are seen. No free   air seen.    IMPRESSION: Multiple dilated loops of bowel measuring up to 5.2 cm.

## 2017-02-16 NOTE — PROGRESS NOTE ADULT - PROBLEM SELECTOR PLAN 3
Elevated BUN likely etiology in setting of dehydration. Patient hypernatremic on admission to 153 with a free water deficit of 2.4 L. Bicarb downtrending. Patient started on d5w+ bicarb additives with elevation of bicarb. Other etiologies include sepsis, but unlikely due to 1/4 SIRS and normal lactate. Unlikely DKA as urine negative for ketones and glucose. will c/w IVF, d5w + bicarb at 100cc/hr and trend BMP nml lactate  Ph 7.38 on abg  suspect due to NS infusion + infection  NS dc'd

## 2017-02-16 NOTE — CONSULT NOTE ADULT - ASSESSMENT
70YO F ( poor historian), PMHx of bipolar disorder, schizophrenia, parkinson's DM, PVD w/ suspected Raynaud's s/p R middle finger amputation, admitted to medicine w/ L 2nd digit cellulitis w/ chronic bowl obstruction  - CT scan- not concerning for free air  - aggressive bowel regimen, disimpaction 70YO F ( poor historian), PMHx of bipolar disorder, schizophrenia, parkinson's DM, PVD w/ suspected Raynaud's s/p R middle finger amputation, admitted to medicine w/ L 2nd digit cellulitis w/ chronic bowl obstruction  - CT scan- not concerning for free air  - aggressive bowel regimen, enema, manual disimpaction  - no surgical intervention  - discussed w/ chief resident and attending  - surgery is signing off, reconsult if needed
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
## 2017-02-16 NOTE — DIETITIAN INITIAL EVALUATION ADULT. - PROBLEM SELECTOR PLAN 1
pt with hx of extensive PVD s/p right finger amputation in Oct 2016. Patient with left finger, 2nd digit pain with discoloration x 1 month. According to vascular, amputation was planned for March 2017. Brought into ED by aid b/c of 9/10 pain at home with minimal relief with Advil. VS stable upon arrival. Given Vancomycin + Zosyn for concern of infection. Vascular and hand surgery consulted, who know the patient from previous admissions. Etiology most likely dry gangrene in setting of PVD, DM similar history. Physical exam remarkable for black, necrotic area over distal left 2nd digit. Painful to touch. normal motor and sensory function. Afebrile without leukocytosis, low concern for sepsis.   - will follow vascular and hand surgery rec's. would like to fix acute issues first before surgery   - Tylenol 650 q6h PRN for pain  - no indication for abx at this time

## 2017-02-16 NOTE — PROGRESS NOTE ADULT - PROBLEM SELECTOR PLAN 3
Elevated BUN likely etiology in setting of dehydration. Patient hypernatremic on admission to 153 with a free water deficit of 2.4 L. Bicarb downtrending. Patient started on d5w+ bicarb additives with elevation of bicarb. Other etiologies include sepsis, but unlikely due to 1/4 SIRS and normal lactate. Unlikely DKA as urine negative for ketones and glucose. will c/w IVF, d5w + bicarb at 100cc/hr and trend BMP

## 2017-02-16 NOTE — ADVANCED PRACTICE NURSE CONSULT - REASON FOR CONSULT
[facility] nurse consult for 72 y/o female (poor historian) with pmhx of bipolar dx, schizophrenia, parkinson's, DM, PVD with suspected Raynaud's s/p right middle finger amputation (10/08/16) presents with left 2nd digit pain x 1 month. Patient noted her left 2nd finger to be black, discolored, causing her excruciating soreness and pain. Pain characterized 9/10, non radiating, slightly improved with Advil with no motor or sensation deficits. Patient also complains of painful ulcers on her buttocks, which has been bleeding for 6 weeks. Unclear duration of ulcers or current ulcer management. Per medical record/ aid, patient has lost > 40 lbs in the last few months

## 2017-02-16 NOTE — CONSULT NOTE ADULT - SUBJECTIVE AND OBJECTIVE BOX
Orthopaedic Surgery Consult Note    For Surgeon: Dr. 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 Patient noted her left index finger to be "black", discolored, causing her severe pain worsened over last 2 weeks. States feels and looks like Right middle finger that needed to be amputated last year. Was supposed to have Left index finger amputated in March with an outside Vascular surgeon but came to [facility] because pain too great. Denies numbness, tingling. She ambulates with a walker.     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  PVD (peripheral vascular disease)  Glaucoma  Bipolar disorder  Schizophrenia  DM (diabetes mellitus)  H/O eye surgery  S/P exploratory laparotomy  H/O knee surgery  S/P lung surgery, follow-up exam    MEDICATIONS  (STANDING):  diVALproex DR 500milliGRAM(s) Oral two times a day  QUEtiapine 100milliGRAM(s) Oral at bedtime  brimonidine 0.2% Ophthalmic Solution 1Drop(s) Both EYES two times a day  insulin lispro (HumaLOG) corrective regimen sliding scale  SubCutaneous Before meals and at bedtime  dextrose 5% 1000milliLiter(s) IV Continuous <Continuous>  thiamine 100milliGRAM(s) Oral daily  folic acid 1milliGRAM(s) Oral daily  multivitamin 1Tablet(s) Oral daily  ascorbic acid 250milliGRAM(s) Oral daily  piperacillin/tazobactam IVPB. 3.375Gram(s) IV Intermittent every 6 hours  vancomycin  IVPB  IV Intermittent   polyethylene glycol 3350 17Gram(s) Oral two times a day  vancomycin  IVPB 750milliGRAM(s) IV Intermittent once  enoxaparin Injectable 50milliGRAM(s) SubCutaneous two times a day    MEDICATIONS  (PRN):  acetaminophen   Tablet. 650milliGRAM(s) Oral every 6 hours PRN Moderate Pain (4 - 6)      Vital Signs Last 24 Hrs  T(C): 36.4, Max: 36.7 (02-15 @ 21:02)  T(F): 97.5, Max: 98 (02-15 @ 21:02)  HR: 74 (72 - 84)  BP: 127/62 (127/58 - 163/75)  BP(mean): --  RR: 16 (16 - 18)  SpO2: 100% (92% - 100%)    Physical Exam:  Left index finger: +black discoloration to distal aspect, warm   sensation intact  ROM intact with pain  brisk cap refill proximally                          7.6    7.3   )-----------( 93       ( 15 Feb 2017 18:56 )             23.8     16 Feb 2017 06:18    152    |  121    |  35     ----------------------------<  126    3.7     |  17     |  1.07     Ca    7.5        16 Feb 2017 06:18  Phos  3.1       16 Feb 2017 06:18  Mg     1.6       16 Feb 2017 06:18    TPro  6.4    /  Alb  2.1    /  TBili  0.2    /  DBili  x      /  AST  9      /  ALT  10     /  AlkPhos  90     15 Feb 2017 18:56    PT/INR - ( 15 Feb 2017 18:56 )   PT: 20.8 sec;   INR: 1.86          PTT - ( 15 Feb 2017 18:56 )  PTT:32.5 sec    Xray Left hand: +sclerotic changes to distal second digit    A/P: 71yFemale with Left second digit gangrene  -For OR saturday partial amputation Left second digit  -Hold lovenox from evening before surgery  -NPO/IVF after midnight  -Pain control  -Discussed with Dr. Jarrett and Medicine team

## 2017-02-17 ENCOUNTER — RESULT REVIEW (OUTPATIENT)
Age: 72
End: 2017-02-17

## 2017-02-17 DIAGNOSIS — D68.9 COAGULATION DEFECT, UNSPECIFIED: ICD-10-CM

## 2017-02-17 DIAGNOSIS — D61.818 OTHER PANCYTOPENIA: ICD-10-CM

## 2017-02-17 LAB
ANION GAP SERPL CALC-SCNC: 10 MMOL/L — SIGNIFICANT CHANGE UP (ref 9–16)
APTT BLD: 31.7 SEC — SIGNIFICANT CHANGE UP (ref 27.5–37.4)
APTT BLD: 45.8 SEC — HIGH (ref 27.5–37.4)
APTT BLD: 45.9 SEC — HIGH (ref 27.5–37.4)
APTT BLD: 50.6 SEC — HIGH (ref 27.5–37.4)
BUN SERPL-MCNC: 24 MG/DL — HIGH (ref 7–23)
CALCIUM SERPL-MCNC: 7.6 MG/DL — LOW (ref 8.5–10.5)
CHLORIDE SERPL-SCNC: 123 MMOL/L — HIGH (ref 96–108)
CO2 SERPL-SCNC: 16 MMOL/L — LOW (ref 22–31)
CREAT SERPL-MCNC: 1.07 MG/DL — SIGNIFICANT CHANGE UP (ref 0.5–1.3)
CRP SERPL-MCNC: 1.45 MG/DL — HIGH
ERYTHROCYTE [SEDIMENTATION RATE] IN BLOOD: 30 MM/HR — HIGH
FIBRINOGEN PPP-MCNC: 301 MG/DL — SIGNIFICANT CHANGE UP (ref 258–438)
GLUCOSE SERPL-MCNC: 51 MG/DL — LOW (ref 70–99)
HCT VFR BLD CALC: 23.4 % — LOW (ref 34.5–45)
HGB BLD-MCNC: 7.6 G/DL — LOW (ref 11.5–15.5)
INR BLD: 1.86 — HIGH (ref 0.88–1.16)
INR BLD: 1.87 — HIGH (ref 0.88–1.16)
MAGNESIUM SERPL-MCNC: 1.7 MG/DL — SIGNIFICANT CHANGE UP (ref 1.6–2.4)
MCHC RBC-ENTMCNC: 27 PG — SIGNIFICANT CHANGE UP (ref 27–34)
MCHC RBC-ENTMCNC: 32.5 G/DL — SIGNIFICANT CHANGE UP (ref 32–36)
MCV RBC AUTO: 83.3 FL — SIGNIFICANT CHANGE UP (ref 80–100)
PLATELET # BLD AUTO: 62 K/UL — LOW (ref 150–400)
POTASSIUM SERPL-MCNC: 4.4 MMOL/L — SIGNIFICANT CHANGE UP (ref 3.5–5.3)
POTASSIUM SERPL-SCNC: 4.4 MMOL/L — SIGNIFICANT CHANGE UP (ref 3.5–5.3)
PROTHROM AB SERPL-ACNC: 20.8 SEC — HIGH (ref 10–13.1)
PROTHROM AB SERPL-ACNC: 20.9 SEC — HIGH (ref 10–13.1)
RBC # BLD: 2.81 M/UL — LOW (ref 3.8–5.2)
RBC # FLD: 19.3 % — HIGH (ref 10.3–16.9)
SODIUM SERPL-SCNC: 149 MMOL/L — HIGH (ref 135–145)
WBC # BLD: 4.7 K/UL — SIGNIFICANT CHANGE UP (ref 3.8–10.5)
WBC # FLD AUTO: 4.7 K/UL — SIGNIFICANT CHANGE UP (ref 3.8–10.5)

## 2017-02-17 PROCEDURE — 99233 SBSQ HOSP IP/OBS HIGH 50: CPT

## 2017-02-17 RX ORDER — HEPARIN SODIUM 5000 [USP'U]/ML
700 INJECTION INTRAVENOUS; SUBCUTANEOUS
Qty: 25000 | Refills: 0 | Status: DISCONTINUED | OUTPATIENT
Start: 2017-02-17 | End: 2017-02-17

## 2017-02-17 RX ORDER — HEPARIN SODIUM 5000 [USP'U]/ML
600 INJECTION INTRAVENOUS; SUBCUTANEOUS
Qty: 25000 | Refills: 0 | Status: DISCONTINUED | OUTPATIENT
Start: 2017-02-17 | End: 2017-02-17

## 2017-02-17 RX ORDER — PHYTONADIONE (VIT K1) 5 MG
5 TABLET ORAL ONCE
Qty: 0 | Refills: 0 | Status: DISCONTINUED | OUTPATIENT
Start: 2017-02-17 | End: 2017-02-17

## 2017-02-17 RX ORDER — SODIUM CHLORIDE 9 MG/ML
1000 INJECTION, SOLUTION INTRAVENOUS
Qty: 0 | Refills: 0 | Status: DISCONTINUED | OUTPATIENT
Start: 2017-02-17 | End: 2017-02-18

## 2017-02-17 RX ORDER — HEPARIN SODIUM 5000 [USP'U]/ML
900 INJECTION INTRAVENOUS; SUBCUTANEOUS
Qty: 25000 | Refills: 0 | Status: DISCONTINUED | OUTPATIENT
Start: 2017-02-17 | End: 2017-02-18

## 2017-02-17 RX ADMIN — Medication 1 APPLICATION(S): at 09:40

## 2017-02-17 RX ADMIN — DIVALPROEX SODIUM 500 MILLIGRAM(S): 500 TABLET, DELAYED RELEASE ORAL at 06:56

## 2017-02-17 RX ADMIN — PIPERACILLIN AND TAZOBACTAM 200 GRAM(S): 4; .5 INJECTION, POWDER, LYOPHILIZED, FOR SOLUTION INTRAVENOUS at 22:03

## 2017-02-17 RX ADMIN — DIVALPROEX SODIUM 500 MILLIGRAM(S): 500 TABLET, DELAYED RELEASE ORAL at 18:22

## 2017-02-17 RX ADMIN — POLYETHYLENE GLYCOL 3350 17 GRAM(S): 17 POWDER, FOR SOLUTION ORAL at 18:22

## 2017-02-17 RX ADMIN — Medication 1 MILLIGRAM(S): at 12:06

## 2017-02-17 RX ADMIN — BRIMONIDINE TARTRATE 1 DROP(S): 2 SOLUTION/ DROPS OPHTHALMIC at 22:03

## 2017-02-17 RX ADMIN — PIPERACILLIN AND TAZOBACTAM 200 GRAM(S): 4; .5 INJECTION, POWDER, LYOPHILIZED, FOR SOLUTION INTRAVENOUS at 03:39

## 2017-02-17 RX ADMIN — POLYETHYLENE GLYCOL 3350 17 GRAM(S): 17 POWDER, FOR SOLUTION ORAL at 06:56

## 2017-02-17 RX ADMIN — PIPERACILLIN AND TAZOBACTAM 200 GRAM(S): 4; .5 INJECTION, POWDER, LYOPHILIZED, FOR SOLUTION INTRAVENOUS at 14:13

## 2017-02-17 RX ADMIN — Medication 1 APPLICATION(S): at 22:04

## 2017-02-17 RX ADMIN — Medication 100 MILLIGRAM(S): at 12:06

## 2017-02-17 RX ADMIN — Medication 250 MILLIGRAM(S): at 12:05

## 2017-02-17 RX ADMIN — HEPARIN SODIUM 6 UNIT(S)/HR: 5000 INJECTION INTRAVENOUS; SUBCUTANEOUS at 02:27

## 2017-02-17 RX ADMIN — PIPERACILLIN AND TAZOBACTAM 200 GRAM(S): 4; .5 INJECTION, POWDER, LYOPHILIZED, FOR SOLUTION INTRAVENOUS at 09:40

## 2017-02-17 RX ADMIN — QUETIAPINE FUMARATE 100 MILLIGRAM(S): 200 TABLET, FILM COATED ORAL at 22:03

## 2017-02-17 RX ADMIN — SODIUM CHLORIDE 80 MILLILITER(S): 9 INJECTION, SOLUTION INTRAVENOUS at 12:05

## 2017-02-17 RX ADMIN — BRIMONIDINE TARTRATE 1 DROP(S): 2 SOLUTION/ DROPS OPHTHALMIC at 14:14

## 2017-02-17 RX ADMIN — Medication 250 MILLIGRAM(S): at 12:06

## 2017-02-17 RX ADMIN — Medication 1 TABLET(S): at 12:06

## 2017-02-17 NOTE — PROGRESS NOTE ADULT - PROBLEM SELECTOR PLAN 7
Likely iron deficiency anemia  Low suspicion for GIB  transfuse if Hb < 7   will trend Likely iron deficiency anemia; s/p 1 unit PRBC with hemoglobin stable 7.6.  -trend CBC, monitor for signs of anemia/bleeding.

## 2017-02-17 NOTE — PROGRESS NOTE ADULT - PROBLEM SELECTOR PLAN 4
new DVT in right mid-distal femoral  - start Lovenox 50mg BID despite thrombocytopenia (platelet count increased to 93). Will use PC of 50 as threshold to stop if decreasing new DVT in right mid-distal femoral  - Heparin gtt, check ptts, will stop 4 hours prior to surgery

## 2017-02-17 NOTE — PROGRESS NOTE ADULT - SUBJECTIVE AND OBJECTIVE BOX
SUBJECTIVE: Patient seen and examined. [ x   ]     Pain (0-10): mild    OBJECTIVE:     Vital Signs Last 24 Hrs  T(C): 37.2, Max: 37.2 ( @ 05:11)  T(F): 98.9, Max: 98.9 ( @ 05:11)  HR: 82 (69 - 82)  BP: 131/65 (113/66 - 131/65)  BP(mean): --  RR: 17 (16 - 17)  SpO2: 99% (96% - 100%)    Affected extremity: Necrotic left hand index finger tip         Sensation: Slightly decreased over necrotic finger tip         Motor exam: intact - able to move DIP/PIP         [ ] warm well perfused; capillary refill <3 seconds     LABS:                        7.5    6.3   )-----------( 68       ( 2017 20:14 )             23.0     2017 16:41    150    |  120    |  31     ----------------------------<  142    3.7     |  17     |  1.04     Ca    7.6        2017 16:41  Phos  3.1       2017 06:18  Mg     1.6       2017 06:18    TPro  6.4    /  Alb  2.1    /  TBili  0.2    /  DBili  x      /  AST  9      /  ALT  10     /  AlkPhos  90     15 Feb 2017 18:56    PT/INR - ( 15 Feb 2017 18:56 )   PT: 20.8 sec;   INR: 1.86          PTT - ( 2017 02:35 )  PTT:31.7 sec  Urinalysis Basic - ( 2017 02:18 )    Color: Yellow / Appearance: Hazy / S.020 / pH: x  Gluc: x / Ketone: NEGATIVE  / Bili: NEGATIVE / Urobili: 0.2 E.U./dL   Blood: x / Protein: 30 mg/dL / Nitrite: POSITIVE   Leuk Esterase: Large / RBC: Many /HPF / WBC Many /HPF   Sq Epi: x / Non Sq Epi: Rare /HPF / Bacteria: Present /HPF        MEDICATIONS:diVALproex DR 500milliGRAM(s) Oral two times a day  QUEtiapine 100milliGRAM(s) Oral at bedtime  brimonidine 0.2% Ophthalmic Solution 1Drop(s) Both EYES two times a day  insulin lispro (HumaLOG) corrective regimen sliding scale  SubCutaneous Before meals and at bedtime  acetaminophen   Tablet. 650milliGRAM(s) Oral every 6 hours PRN  thiamine 100milliGRAM(s) Oral daily  folic acid 1milliGRAM(s) Oral daily  multivitamin 1Tablet(s) Oral daily  ascorbic acid 250milliGRAM(s) Oral daily  piperacillin/tazobactam IVPB. 3.375Gram(s) IV Intermittent every 6 hours  vancomycin  IVPB  IV Intermittent   polyethylene glycol 3350 17Gram(s) Oral two times a day  vancomycin  IVPB 750milliGRAM(s) IV Intermittent every 24 hours  silver sulfADIAZINE 1% Cream 1Application(s) Topical two times a day  heparin  Infusion 600Unit(s)/Hr IV Continuous <Continuous>    Anticoagulation:  heparin  Infusion 600Unit(s)/Hr IV Continuous <Continuous>      Antibiotics:   piperacillin/tazobactam IVPB. 3.375Gram(s) IV Intermittent every 6 hours  vancomycin  IVPB  IV Intermittent   vancomycin  IVPB 750milliGRAM(s) IV Intermittent every 24 hours      Pain medications:   diVALproex DR 500milliGRAM(s) Oral two times a day  QUEtiapine 100milliGRAM(s) Oral at bedtime  acetaminophen   Tablet. 650milliGRAM(s) Oral every 6 hours PRN      RADIOLOGY & ADDITIONAL STUDIES:    A/P :  diVALproex DR 500milliGRAM(s) Oral two times a day  QUEtiapine 100milliGRAM(s) Oral at bedtime  brimonidine 0.2% Ophthalmic Solution 1Drop(s) Both EYES two times a day  insulin lispro (HumaLOG) corrective regimen sliding scale  SubCutaneous Before meals and at bedtime  acetaminophen   Tablet. 650milliGRAM(s) Oral every 6 hours PRN  thiamine 100milliGRAM(s) Oral daily  folic acid 1milliGRAM(s) Oral daily  multivitamin 1Tablet(s) Oral daily  ascorbic acid 250milliGRAM(s) Oral daily  piperacillin/tazobactam IVPB. 3.375Gram(s) IV Intermittent every 6 hours  vancomycin  IVPB  IV Intermittent   polyethylene glycol 3350 17Gram(s) Oral two times a day  vancomycin  IVPB 750milliGRAM(s) IV Intermittent every 24 hours  silver sulfADIAZINE 1% Cream 1Application(s) Topical two times a day  heparin  Infusion 600Unit(s)/Hr IV Continuous <Continuous>   heparin  Infusion 600Unit(s)/Hr IV Continuous <Continuous>     71 y F w/ necrotic L index fingertip  -    Pain control  -    For OR   -    Ortho Will follow

## 2017-02-17 NOTE — PROGRESS NOTE ADULT - PROBLEM SELECTOR PLAN 2
s/p 1 u prbc  NO active bleeding or hemolysis identified  suspect acute drop in hb was dilutional  brown stool in vault/ct a/p (-) RP or intraperitoneal bleed/hematoma, LDH, Haptoglobin wnl.   transfuse if Hb < 7   will trend cbc

## 2017-02-17 NOTE — PROGRESS NOTE ADULT - PROBLEM SELECTOR PLAN 3
chronic issue (baseline plts 50-90k)  b12, folate levels wnl  check hepatitis b/c  screen chronic issue (baseline plts 50-90k)  b12, folate levels wnl, doubt chronic DIC (ptt wnl prior to heparin gtt)  check hepatitis b/c  screen

## 2017-02-17 NOTE — PROGRESS NOTE ADULT - PROBLEM SELECTOR PLAN 4
concern for infection (2nd digit gangrene)  TSH normal  check AM cortisol  f/u bld cx  c/w Vancomycin + Zosyn  - f/u ESR, CRP

## 2017-02-17 NOTE — PROGRESS NOTE ADULT - PROBLEM SELECTOR PLAN 3
Elevated BUN likely etiology in setting of dehydration. Patient hypernatremic on admission to 153 with a free water deficit of 2.4 L. Bicarb downtrending. Patient started on d5w+ bicarb additives with elevation of bicarb. Other etiologies include sepsis, but unlikely due to 1/4 SIRS and normal lactate. Unlikely DKA as urine negative for ketones and glucose. will c/w IVF, d5w + bicarb at 100cc/hr and trend BMP Resolved. Elevated BUN likely etiology in setting of dehydration. Patient hypernatremic on admission to 153 with a free water deficit of 2.4 L. Bicarb downtrending.  -D5W at 80cc/hr for hypernatremia.

## 2017-02-17 NOTE — CONSULT NOTE ADULT - PROBLEM SELECTOR RECOMMENDATION 2
elevated INR from unknown cause. Will get mixing study. Give vitamin K 5 mg today and trend INR tomorrow.

## 2017-02-17 NOTE — CONSULT NOTE ADULT - PROBLEM SELECTOR RECOMMENDATION 9
uncertain etiology at this time. Pt has had fluctuating pancytopenia for the past 2 years. No clear cause on peripheral smear from today. Pt will need a bone marrow biopsy. I will perform one on Tuesday if pt continues to be admitted and if not she may get it as outpt upon follow up with Dr. Lopez and me. Please check cryoglobulins given multiple amputations and previous history of HCV.

## 2017-02-17 NOTE — CONSULT NOTE ADULT - SUBJECTIVE AND OBJECTIVE BOX
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 Patient noted her left 2nd finger to be black, discolored, causing her excruciating soreness and pain. Pain characterized 9/10, non radiating, slightly improved with Advil with no motor or sensation deficits. Patient says she takes 6 Advil tablets/ day since november. Patient reports last BM was 2 days ago with bright red blood in her stool, unclear duration of bloody stools and she states she had a colonoscopy " a few months ago", though history is unreliable. Patient also complains of painful ulcers on her buttocks, which has been bleeding for 6 weeks. Unclear duration of ulcers or current ulcer management. Per medical record/ aid, patient has lost > 40 lbs in the last few months. ROS negative for headaches, fevers, blurry vision, chest pain, or abdominal pain. Patient states she has a home health aid that comes daily. She ambulates with a walker.     In the ED, VS: temp: 97.2, HR: 87, BP: 129/61, RR: 17, O2: 100% on room air. Labs significant for Hb 7.3, platelet count: 63, Na, 153, Carbon Dioxide: 18, BUN: 38, Albumin: 2.0. Patient given 2L IVF bolus, s/p Vancomycin + Zosyn. Vascular consulted, recommended to defer to hand surgery. Per vascular, left 2nd digit amputation scheduled for 2017. Hand surgery consulted.     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  PVD (peripheral vascular disease)  Glaucoma  Bipolar disorder  Schizophrenia  DM (diabetes mellitus)  H/O eye surgery  S/P exploratory laparotomy  H/O knee surgery  S/P lung surgery, follow-up exam	    MEDICATIONS  (STANDING):  diVALproex DR 500milliGRAM(s) Oral two times a day  QUEtiapine 100milliGRAM(s) Oral at bedtime  brimonidine 0.2% Ophthalmic Solution 1Drop(s) Both EYES two times a day  insulin lispro (HumaLOG) corrective regimen sliding scale  SubCutaneous Before meals and at bedtime  thiamine 100milliGRAM(s) Oral daily  folic acid 1milliGRAM(s) Oral daily  multivitamin 1Tablet(s) Oral daily  ascorbic acid 250milliGRAM(s) Oral daily  piperacillin/tazobactam IVPB. 3.375Gram(s) IV Intermittent every 6 hours  vancomycin  IVPB  IV Intermittent   polyethylene glycol 3350 17Gram(s) Oral two times a day  vancomycin  IVPB 750milliGRAM(s) IV Intermittent every 24 hours  silver sulfADIAZINE 1% Cream 1Application(s) Topical two times a day  heparin  Infusion 700Unit(s)/Hr IV Continuous <Continuous>  dextrose 5%. 1000milliLiter(s) IV Continuous <Continuous>    MEDICATIONS  (PRN):  acetaminophen   Tablet. 650milliGRAM(s) Oral every 6 hours PRN Moderate Pain (4 - 6)      Allergies: No Known Allergies    SOCIAL HISTORY:    FAMILY HISTORY:      Vital Signs Last 24 Hrs  T(C): 36.9, Max: 37.2 ( @ 05:11)  T(F): 98.5, Max: 98.9 ( @ 05:11)  HR: 75 (69 - 82)  BP: 111/56 (111/56 - 146/89)  BP(mean): --  RR: 17 (16 - 17)  SpO2: 100% (96% - 100%)    PHYSICAL EXAM:    T(F): 98.5, Max: 98.9 ( @ 05:11)  HR: 75 (69 - 82)  BP: 111/56 (111/56 - 146/89)  RR: 17 (16 - 17)  SpO2: 100% (96% - 100%)    Gen: well developed, well nourished, comfortable  HEENT: normocephalic/atraumatic, no conjunctival pallor, no scleral icterus, no oral thrush/mucosal bleeding/mucositis  Neck: supple, no masses, no JVD  Cardiovascular: RR, nl S1S2, no murmurs/rubs/gallops  Respiratory: clear air entry b/l  Gastrointestinal: BS+, soft, NT/ND, no masses, no splenomegaly, no hepatomegaly, no evidence for ascites  Extremities: no clubbing/cyanosis, no edema, no calf tenderness  Neurological: CN 2-12 grossly intact, no focal deficits  Lymph Nodes:  no cervical/supraclavicular LAD, no axillary/groin LAD    LABS:                        7.6    4.7   )-----------( 62       ( 2017 08:14 )             23.4     2017 08:14    149    |  123    |  24     ----------------------------<  51     4.4     |  16     |  1.07     Ca    7.6        2017 08:14  Phos  3.1       2017 06:18  Mg     1.7       2017 08:14    TPro  6.4    /  Alb  2.1    /  TBili  0.2    /  DBili  x      /  AST  9      /  ALT  10     /  AlkPhos  90     15 Feb 2017 18:56    PT/INR - ( 2017 08:14 )   PT: 20.9 sec;   INR: 1.87          PTT - ( 2017 08:14 )  PTT:45.8 sec  Urinalysis Basic - ( 2017 02:18 )    Color: Yellow / Appearance: Hazy / S.020 / pH: x  Gluc: x / Ketone: NEGATIVE  / Bili: NEGATIVE / Urobili: 0.2 E.U./dL   Blood: x / Protein: 30 mg/dL / Nitrite: POSITIVE   Leuk Esterase: Large / RBC: Many /HPF / WBC Many /HPF   Sq Epi: x / Non Sq Epi: Rare /HPF / Bacteria: Present /HPF        RADIOLOGY & ADDITIONAL STUDIES:

## 2017-02-17 NOTE — PROGRESS NOTE ADULT - PROBLEM SELECTOR PLAN 10
pt w/o angina /dyspnea, no evidence of ACS, decompensated CHF, arrythmia.   unable to assess METS as pt is primarily wheel chair bound  RCRI = II , pt is at intermediate risk for low-intermediate risk surgery.   ekg - w/o ischemic changes   maintain plts >50K , INR 1.8 today  hold heparin gtt 4 hrs prior to surgery

## 2017-02-17 NOTE — PROGRESS NOTE ADULT - PROBLEM SELECTOR PLAN 1
dry gangrene of left 2nd digit. distal phalange black, necrotic, painful to touch. hx of extensive PVD with previous amputation of right middle finger. Patient to undergo amputation on Saturday morning by hand surgery pending medical clearance dry gangrene of left 2nd digit. distal phalange black, necrotic, painful to touch. hx of extensive PVD with previous amputation of right middle finger. Patient to undergo amputation on Saturday morning by hand surgery.

## 2017-02-17 NOTE — PROGRESS NOTE ADULT - ASSESSMENT
72 y/o with extensive PVD, psychiatric illness, multiple ulcers, presents with finger pain 2/2 dry gangrene.

## 2017-02-17 NOTE — PROGRESS NOTE ADULT - SUBJECTIVE AND OBJECTIVE BOX
Patient is a 71y old  Female who presents with a chief complaint of Finger pain (15 Feb 2017 17:28)    INTERVAL HPI/OVERNIGHT EVENTS:    feels well  still with left 2nd finger tip pain  hypothermic to 96F , placed back on nisha hugger  denies abd pain, nausea, vomiting. eating well (per pt and nurse)       ( - ) dyspnea  (  - ) cough  (  - ) chest pain  (  - ) palpatations  ( - ) dizziness/lightheadedness  (  - ) diarrhea  (  - ) melena  (  - ) hematochezia  (  - ) dysuria  ( - ) hematuria  (  - ) leg swelling  ( -) calf tenderness  (  - ) motor weakness  (  - ) extremity numbness  ( - ) back pain  ( + ) tolerating POs  ( + ) BM  ROS: 12 point review of systems otherwise negative    MEDICATIONS  (STANDING):  diVALproex DR 500milliGRAM(s) Oral two times a day  QUEtiapine 100milliGRAM(s) Oral at bedtime  brimonidine 0.2% Ophthalmic Solution 1Drop(s) Both EYES two times a day  insulin lispro (HumaLOG) corrective regimen sliding scale  SubCutaneous Before meals and at bedtime  thiamine 100milliGRAM(s) Oral daily  folic acid 1milliGRAM(s) Oral daily  multivitamin 1Tablet(s) Oral daily  ascorbic acid 250milliGRAM(s) Oral daily  piperacillin/tazobactam IVPB. 3.375Gram(s) IV Intermittent every 6 hours  vancomycin  IVPB  IV Intermittent   polyethylene glycol 3350 17Gram(s) Oral two times a day  vancomycin  IVPB 750milliGRAM(s) IV Intermittent every 24 hours  silver sulfADIAZINE 1% Cream 1Application(s) Topical two times a day  heparin  Infusion 700Unit(s)/Hr IV Continuous <Continuous>  dextrose 5%. 1000milliLiter(s) IV Continuous <Continuous>    MEDICATIONS  (PRN):  acetaminophen   Tablet. 650milliGRAM(s) Oral every 6 hours PRN Moderate Pain (4 - 6)    Allergies    No Known Allergies    Intolerances        Vital Signs Last 24 Hrs  T(C): 37.2, Max: 37.2 ( @ 05:11)  T(F): 98.9, Max: 98.9 ( @ 05:11)  HR: 74 (69 - 82)  BP: 146/89 (113/66 - 146/89)  BP(mean): --  RR: 17 (16 - 17)  SpO2: 97% (96% - 100%)  CAPILLARY BLOOD GLUCOSE  70 (2017 09:06)  64 (2017 07:59)  87 (2017 23:58)  67 (2017 22:52)  60 (2017 21:16)  101 (2017 17:40)  105 (2017 12:24)  67 (2017 12:08)    I&O's Summary  I & Os for 24h ending 2017 07:00  =============================================  IN: 227 ml / OUT: 0 ml / NET: 227 ml    I & Os for current day (as of 2017 11:35)  =============================================  IN: 6 ml / OUT: 0 ml / NET: 6 ml    Physical Exam:    Daily     Daily   General:  Well appearing, (+) alopecia  HEENT:  Nonicteric, right eye pupil reactive, left eye (+) cataract, neck supple  CV: S1S2 nml,  RRR,  Lungs:  CTA B/L, no wheezes, rhonchi, rales  Abdomen:  (+) hypoactive bowel sounds, Soft, non-tender,  distended  Extremities:  2+ DP pulses b/l, no extremity clubbing/cyanosis/ edema  Back: no CVA tenderness, no midline vertebral tenderness  Skin:   stage ii-iii sacral decubiti, stage III ulcer on left anterior thigh  :  No hubbard  Neuro:  AAOx3,  CN II-XII grossly intact ,   4/5 str all 4 extremities, sensation intact b/l  No Restraints      LABS:                        7.6    4.7   )-----------( 62       ( 2017 08:14 )             23.4     2017 08:14    149    |  123    |  24     ----------------------------<  51     4.4     |  16     |  1.07     Ca    7.6        2017 08:14  Phos  3.1       2017 06:18  Mg     1.7       2017 08:14    TPro  6.4    /  Alb  2.1    /  TBili  0.2    /  DBili  x      /  AST  9      /  ALT  10     /  AlkPhos  90     15 Feb 2017 18:56    LIVER FUNCTIONS - ( 15 Feb 2017 18:56 )  Alb: 2.1 g/dL / Pro: 6.4 g/dL / ALK PHOS: 90 U/L / ALT: 10 U/L / AST: 9 U/L / GGT: x           PT/INR - ( 2017 08:14 )   PT: 20.9 sec;   INR: 1.87          PTT - ( 2017 08:14 )  PTT:45.8 sec  Urinalysis Basic - ( 2017 02:18 )    Color: Yellow / Appearance: Hazy / S.020 / pH: x  Gluc: x / Ketone: NEGATIVE  / Bili: NEGATIVE / Urobili: 0.2 E.U./dL   Blood: x / Protein: 30 mg/dL / Nitrite: POSITIVE   Leuk Esterase: Large / RBC: Many /HPF / WBC Many /HPF   Sq Epi: x / Non Sq Epi: Rare /HPF / Bacteria: Present /HPF

## 2017-02-17 NOTE — PROGRESS NOTE ADULT - SUBJECTIVE AND OBJECTIVE BOX
INTERVAL HPI/OVERNIGHT EVENTS:  Patient was seen and examined at bedside. As per nurse and patient, no o/n events, patient resting comfortably. No complaints at this time. Patient denies fever, chills, dizziness, weakness, HA, Changes in vision, CP, palpitations, SOB, cough, N/V/D/C, dysuria, changes in bowel movements, LE edema.    VITAL SIGNS:  T(F): 98.9  HR: 82  BP: 131/65  RR: 17  SpO2: 99%  Wt(kg): --    PHYSICAL EXAM:    Constitutional: WDWN, NAD  Eyes: PERRL, EOMI, sclera non-icteric  Neck: supple, trachea midline, no masses, no JVD  Respiratory: CTA b/l, good air entry b/l, no wheezing, rhonchi, rales, without accessory muscle use and no intercostal retractions  Cardiovascular: RRR, normal S1S2, no M/R/G  Gastrointestinal: soft, NTND, no masses palpable, BS normal  Extremities: Warm, well perfused, pulses equal bilateral upper and lower extremities, no edema, no clubbing  Neurological: AAOx3, CN Grossly intact  Skin: Normal temperature, warm, dry    MEDICATIONS  (STANDING):  diVALproex DR 500milliGRAM(s) Oral two times a day  QUEtiapine 100milliGRAM(s) Oral at bedtime  brimonidine 0.2% Ophthalmic Solution 1Drop(s) Both EYES two times a day  insulin lispro (HumaLOG) corrective regimen sliding scale  SubCutaneous Before meals and at bedtime  thiamine 100milliGRAM(s) Oral daily  folic acid 1milliGRAM(s) Oral daily  multivitamin 1Tablet(s) Oral daily  ascorbic acid 250milliGRAM(s) Oral daily  piperacillin/tazobactam IVPB. 3.375Gram(s) IV Intermittent every 6 hours  vancomycin  IVPB  IV Intermittent   polyethylene glycol 3350 17Gram(s) Oral two times a day  vancomycin  IVPB 750milliGRAM(s) IV Intermittent every 24 hours  silver sulfADIAZINE 1% Cream 1Application(s) Topical two times a day  heparin  Infusion 600Unit(s)/Hr IV Continuous <Continuous>    MEDICATIONS  (PRN):  acetaminophen   Tablet. 650milliGRAM(s) Oral every 6 hours PRN Moderate Pain (4 - 6)      Allergies    No Known Allergies    Intolerances        LABS:                        7.5    6.3   )-----------( 68       ( 2017 20:14 )             23.0     2017 16:41    150    |  120    |  31     ----------------------------<  142    3.7     |  17     |  1.04     Ca    7.6        2017 16:41  Phos  3.1       2017 06:18  Mg     1.6       2017 06:18    TPro  6.4    /  Alb  2.1    /  TBili  0.2    /  DBili  x      /  AST  9      /  ALT  10     /  AlkPhos  90     15 Feb 2017 18:56    PT/INR - ( 15 Feb 2017 18:56 )   PT: 20.8 sec;   INR: 1.86          PTT - ( 2017 02:35 )  PTT:31.7 sec  Urinalysis Basic - ( 2017 02:18 )    Color: Yellow / Appearance: Hazy / S.020 / pH: x  Gluc: x / Ketone: NEGATIVE  / Bili: NEGATIVE / Urobili: 0.2 E.U./dL   Blood: x / Protein: 30 mg/dL / Nitrite: POSITIVE   Leuk Esterase: Large / RBC: Many /HPF / WBC Many /HPF   Sq Epi: x / Non Sq Epi: Rare /HPF / Bacteria: Present /HPF        RADIOLOGY & ADDITIONAL TESTS: INTERVAL HPI/OVERNIGHT EVENTS:  Patient was seen and examined at bedside.  RENÉ overnight. Patient slept well, no compaints this AM.     VITAL SIGNS:  T(F): 98.9  HR: 82  BP: 131/65  RR: 17  SpO2: 99%  Wt(kg): --    PHYSICAL EXAM:    Constitutional: NAD, cachectic  Eyes: Sclera non icteric, left eye cachectic.   Neck: supple, trachea midline, no masses, no JVD  Respiratory: CTAB, no wheezes, rhonchi, rales  Cardiovascular: RRR, normal S1S2, no M/R/G  Gastrointestinal: soft, non-tender, distended, +BS  Extremities: Warm, well perfused, pulses equal bilateral upper and lower extremities, no edema, no clubbing  Neurological: AAOx3, CN Grossly intact  Skin: Normal temperature, warm, dry; left index finger tip black, some pain to palpation    MEDICATIONS  (STANDING):  diVALproex DR 500milliGRAM(s) Oral two times a day  QUEtiapine 100milliGRAM(s) Oral at bedtime  brimonidine 0.2% Ophthalmic Solution 1Drop(s) Both EYES two times a day  insulin lispro (HumaLOG) corrective regimen sliding scale  SubCutaneous Before meals and at bedtime  thiamine 100milliGRAM(s) Oral daily  folic acid 1milliGRAM(s) Oral daily  multivitamin 1Tablet(s) Oral daily  ascorbic acid 250milliGRAM(s) Oral daily  piperacillin/tazobactam IVPB. 3.375Gram(s) IV Intermittent every 6 hours  vancomycin  IVPB  IV Intermittent   polyethylene glycol 3350 17Gram(s) Oral two times a day  vancomycin  IVPB 750milliGRAM(s) IV Intermittent every 24 hours  silver sulfADIAZINE 1% Cream 1Application(s) Topical two times a day  heparin  Infusion 600Unit(s)/Hr IV Continuous <Continuous>    MEDICATIONS  (PRN):  acetaminophen   Tablet. 650milliGRAM(s) Oral every 6 hours PRN Moderate Pain (4 - 6)      Allergies    No Known Allergies    Intolerances        LABS:                        7.5    6.3   )-----------( 68       ( 2017 20:14 )             23.0     2017 16:41    150    |  120    |  31     ----------------------------<  142    3.7     |  17     |  1.04     Ca    7.6        2017 16:41  Phos  3.1       2017 06:18  Mg     1.6       2017 06:18    TPro  6.4    /  Alb  2.1    /  TBili  0.2    /  DBili  x      /  AST  9      /  ALT  10     /  AlkPhos  90     15 Feb 2017 18:56    PT/INR - ( 15 Feb 2017 18:56 )   PT: 20.8 sec;   INR: 1.86          PTT - ( 2017 02:35 )  PTT:31.7 sec  Urinalysis Basic - ( 2017 02:18 )    Color: Yellow / Appearance: Hazy / S.020 / pH: x  Gluc: x / Ketone: NEGATIVE  / Bili: NEGATIVE / Urobili: 0.2 E.U./dL   Blood: x / Protein: 30 mg/dL / Nitrite: POSITIVE   Leuk Esterase: Large / RBC: Many /HPF / WBC Many /HPF   Sq Epi: x / Non Sq Epi: Rare /HPF / Bacteria: Present /HPF        RADIOLOGY & ADDITIONAL TESTS:

## 2017-02-17 NOTE — PROGRESS NOTE ADULT - PROBLEM SELECTOR PLAN 8
c/w home Depakote and Seroquel    #Abdominal Distension: nontender, no guarding. Xray with dilated loops of bowel. Patient to be given enema and started on aggressive bowel regimen.   - f/u CT scan c/w home Depakote and Seroquel    #Abdominal Distension: nontender, no guarding. Xray with dilated loops of bowel. Patient to be given enema and started on aggressive bowel regimen.   -  CT showing some ascites, severe anasarca

## 2017-02-17 NOTE — PROGRESS NOTE ADULT - PROBLEM SELECTOR PLAN 2
95.7 this morning. No other SIRS criteria. 1/4 SIRS. Sources include dry gangrenous finger, +UA, multiple ulcers. TSH normal. Will give warming blanket. Will empirically treat with IV Vancomycin + Zosyn  - f/u ESR, CRP 95.7 this morning. No other SIRS criteria. 1/4 SIRS. Sources include dry gangrenous finger, +UA, multiple ulcers. TSH normal. Will give warming blanket. Will empirically treat with IV Vancomycin + Zosyn  - f/u ESR, CRP levels

## 2017-02-17 NOTE — PROGRESS NOTE ADULT - PROBLEM SELECTOR PLAN 10
regular diet  IVF, d5w+bicarb @ 100cc/hr   monitor/ replete javier     Dispo: d/c to skilled care facility pending medical clearance     FULL CODE regular diet  IVF, d5w @80ccs  monitor/ replete javier     Dispo: d/c to skilled care facility pending medical clearance     FULL CODE

## 2017-02-18 DIAGNOSIS — E87.0 HYPEROSMOLALITY AND HYPERNATREMIA: ICD-10-CM

## 2017-02-18 LAB
ANION GAP SERPL CALC-SCNC: 7 MMOL/L — LOW (ref 9–16)
BLD GP AB SCN SERPL QL: NEGATIVE — SIGNIFICANT CHANGE UP
BUN SERPL-MCNC: 24 MG/DL — HIGH (ref 7–23)
CALCIUM SERPL-MCNC: 7.7 MG/DL — LOW (ref 8.5–10.5)
CHLORIDE SERPL-SCNC: 120 MMOL/L — HIGH (ref 96–108)
CO2 SERPL-SCNC: 18 MMOL/L — LOW (ref 22–31)
CORTIS AM PEAK SERPL-MCNC: 8.4 UG/DL — SIGNIFICANT CHANGE UP (ref 3.9–37.5)
CREAT SERPL-MCNC: 1.17 MG/DL — SIGNIFICANT CHANGE UP (ref 0.5–1.3)
GLUCOSE SERPL-MCNC: 131 MG/DL — HIGH (ref 70–99)
HCT VFR BLD CALC: 21.2 % — LOW (ref 34.5–45)
HGB BLD-MCNC: 6.8 G/DL — CRITICAL LOW (ref 11.5–15.5)
MAGNESIUM SERPL-MCNC: 1.6 MG/DL — SIGNIFICANT CHANGE UP (ref 1.6–2.4)
MCHC RBC-ENTMCNC: 26.9 PG — LOW (ref 27–34)
MCHC RBC-ENTMCNC: 32.1 G/DL — SIGNIFICANT CHANGE UP (ref 32–36)
MCV RBC AUTO: 83.8 FL — SIGNIFICANT CHANGE UP (ref 80–100)
PHOSPHATE SERPL-MCNC: 1.8 MG/DL — LOW (ref 2.5–4.5)
PLATELET # BLD AUTO: 60 K/UL — LOW (ref 150–400)
POTASSIUM SERPL-MCNC: 4.9 MMOL/L — SIGNIFICANT CHANGE UP (ref 3.5–5.3)
POTASSIUM SERPL-SCNC: 4.9 MMOL/L — SIGNIFICANT CHANGE UP (ref 3.5–5.3)
RBC # BLD: 2.53 M/UL — LOW (ref 3.8–5.2)
RBC # FLD: 19.9 % — HIGH (ref 10.3–16.9)
RH IG SCN BLD-IMP: POSITIVE — SIGNIFICANT CHANGE UP
SODIUM SERPL-SCNC: 145 MMOL/L — SIGNIFICANT CHANGE UP (ref 135–145)
WBC # BLD: 6.5 K/UL — SIGNIFICANT CHANGE UP (ref 3.8–10.5)
WBC # FLD AUTO: 6.5 K/UL — SIGNIFICANT CHANGE UP (ref 3.8–10.5)

## 2017-02-18 RX ORDER — MORPHINE SULFATE 50 MG/1
4 CAPSULE, EXTENDED RELEASE ORAL
Qty: 0 | Refills: 0 | Status: DISCONTINUED | OUTPATIENT
Start: 2017-02-18 | End: 2017-02-21

## 2017-02-18 RX ORDER — HEPARIN SODIUM 5000 [USP'U]/ML
900 INJECTION INTRAVENOUS; SUBCUTANEOUS
Qty: 25000 | Refills: 0 | Status: DISCONTINUED | OUTPATIENT
Start: 2017-02-18 | End: 2017-02-20

## 2017-02-18 RX ORDER — MAGNESIUM SULFATE 500 MG/ML
2 VIAL (ML) INJECTION ONCE
Qty: 0 | Refills: 0 | Status: COMPLETED | OUTPATIENT
Start: 2017-02-18 | End: 2017-02-18

## 2017-02-18 RX ORDER — DEXTROSE 50 % IN WATER 50 %
25 SYRINGE (ML) INTRAVENOUS ONCE
Qty: 0 | Refills: 0 | Status: COMPLETED | OUTPATIENT
Start: 2017-02-18 | End: 2017-02-18

## 2017-02-18 RX ORDER — ONDANSETRON 8 MG/1
4 TABLET, FILM COATED ORAL ONCE
Qty: 0 | Refills: 0 | Status: DISCONTINUED | OUTPATIENT
Start: 2017-02-18 | End: 2017-02-22

## 2017-02-18 RX ADMIN — Medication 250 MILLIGRAM(S): at 19:16

## 2017-02-18 RX ADMIN — Medication 100 MILLIGRAM(S): at 17:47

## 2017-02-18 RX ADMIN — SODIUM CHLORIDE 80 MILLILITER(S): 9 INJECTION, SOLUTION INTRAVENOUS at 06:38

## 2017-02-18 RX ADMIN — PIPERACILLIN AND TAZOBACTAM 200 GRAM(S): 4; .5 INJECTION, POWDER, LYOPHILIZED, FOR SOLUTION INTRAVENOUS at 18:01

## 2017-02-18 RX ADMIN — Medication 25 GRAM(S): at 11:47

## 2017-02-18 RX ADMIN — Medication 250 MILLIGRAM(S): at 17:48

## 2017-02-18 RX ADMIN — HEPARIN SODIUM 9 UNIT(S)/HR: 5000 INJECTION INTRAVENOUS; SUBCUTANEOUS at 21:45

## 2017-02-18 RX ADMIN — PIPERACILLIN AND TAZOBACTAM 200 GRAM(S): 4; .5 INJECTION, POWDER, LYOPHILIZED, FOR SOLUTION INTRAVENOUS at 10:51

## 2017-02-18 RX ADMIN — DIVALPROEX SODIUM 500 MILLIGRAM(S): 500 TABLET, DELAYED RELEASE ORAL at 17:48

## 2017-02-18 RX ADMIN — PIPERACILLIN AND TAZOBACTAM 200 GRAM(S): 4; .5 INJECTION, POWDER, LYOPHILIZED, FOR SOLUTION INTRAVENOUS at 03:11

## 2017-02-18 RX ADMIN — BRIMONIDINE TARTRATE 1 DROP(S): 2 SOLUTION/ DROPS OPHTHALMIC at 17:49

## 2017-02-18 RX ADMIN — POLYETHYLENE GLYCOL 3350 17 GRAM(S): 17 POWDER, FOR SOLUTION ORAL at 17:48

## 2017-02-18 RX ADMIN — Medication 1 APPLICATION(S): at 21:46

## 2017-02-18 RX ADMIN — Medication 1 MILLIGRAM(S): at 17:49

## 2017-02-18 RX ADMIN — QUETIAPINE FUMARATE 100 MILLIGRAM(S): 200 TABLET, FILM COATED ORAL at 21:46

## 2017-02-18 RX ADMIN — PIPERACILLIN AND TAZOBACTAM 200 GRAM(S): 4; .5 INJECTION, POWDER, LYOPHILIZED, FOR SOLUTION INTRAVENOUS at 23:29

## 2017-02-18 RX ADMIN — DIVALPROEX SODIUM 500 MILLIGRAM(S): 500 TABLET, DELAYED RELEASE ORAL at 06:38

## 2017-02-18 RX ADMIN — Medication 50 GRAM(S): at 10:52

## 2017-02-18 RX ADMIN — BRIMONIDINE TARTRATE 1 DROP(S): 2 SOLUTION/ DROPS OPHTHALMIC at 21:46

## 2017-02-18 RX ADMIN — Medication 1 TABLET(S): at 17:50

## 2017-02-18 NOTE — PROGRESS NOTE ADULT - PROBLEM SELECTOR PLAN 5
right mid-distal femoral  on heparin gtt, holding off on coumadin until Heme does bone marrow biopsy concern for infection (2nd digit gangrene)  f/u bld cx  c/w Vancomycin + Zosyn  - f/u ESR, CRP

## 2017-02-18 NOTE — PROGRESS NOTE ADULT - SUBJECTIVE AND OBJECTIVE BOX
S: Patient seen and examined. Doing well this AM. Pain reported but controlled. No acute events overnight.    O: AFVSS  Motor: intact WG/WF/WE/T/Deltoid, able to fire fingers actively BL  Necrotic left hand index finger tip  AIN/PIN/Ulnar nerve distribution intact BL. decreased over necrotic finger tip  radial and ulnar artery well perfused BL  Dressing C/D/I    Vital Signs Last 24 Hrs  T(C): 36.4, Max: 36.9 (02-17 @ 16:41)  T(F): 97.5, Max: 98.5 (02-17 @ 16:41)  HR: 77 (74 - 77)  BP: 142/80 (111/56 - 146/89)  BP(mean): --  RR: 19 (17 - 19)  SpO2: 100% (97% - 100%)

## 2017-02-18 NOTE — PROGRESS NOTE ADULT - PROBLEM SELECTOR PLAN 8
c/w home Depakote and Seroquel    #Abdominal Distension: nontender, no guarding. Xray with dilated loops of bowel. Patient to be given enema and started on aggressive bowel regimen.   -  CT showing some ascites, severe anasarca

## 2017-02-18 NOTE — PROGRESS NOTE ADULT - PROBLEM SELECTOR PLAN 1
dry gangrene of left 2nd digit. distal phalange black, necrotic, painful to touch. hx of extensive PVD with previous amputation of right middle finger. Patient to undergo amputation on Saturday morning by hand surgery. dry gangrene of left 2nd digit. distal phalange black, necrotic, painful to touch. hx of extensive PVD with previous amputation of right middle finger. s/p amputation, tolerated well. Morphine for pain control.

## 2017-02-18 NOTE — PROGRESS NOTE ADULT - PROBLEM SELECTOR PLAN 7
Likely iron deficiency anemia; s/p 1 unit PRBC with hemoglobin stable 7.6.  -trend CBC, monitor for signs of anemia/bleeding. Likely iron deficiency anemia; s/p 1 unit PRBC with hemoglobin <7 This AM s/p 1 unit PRBC.  -trend CBC, monitor for signs of anemia/bleeding.

## 2017-02-18 NOTE — PROGRESS NOTE ADULT - PROBLEM SELECTOR PLAN 1
left 2nd digit amputated. Ortho says may restart heparin this pm.   will c/w vanc/zosyn for now, f.u ESR.

## 2017-02-18 NOTE — PROGRESS NOTE ADULT - SUBJECTIVE AND OBJECTIVE BOX
Orthopedics Post Op Check    Procedure:  L index distal phalanx amputation  Surgeon: Kolby    S: Patient seen and examined. Resting comfortably. No acute concerns.  Denies CP, SOB, N/V, numbness/tingling      O:   Vital Signs Last 24 Hrs  T(C): 35.3, Max: 36.4 (02-18 @ 05:02)  T(F): 95.6, Max: 97.5 (02-18 @ 05:02)  HR: 52 (49 - 77)  BP: 154/65 (93/46 - 154/65)  BP(mean): --  RR: 16 (14 - 19)  SpO2: 98% (98% - 100%)    Motor: intact WG/WF/WE/T/Deltoid, able to fire fingers actively BL  AIN/PIN/Ulnar nerve distribution intact BL as able to assess over bandage  radial and ulnar artery well perfused BL  Dressing C/D/I over left index finger

## 2017-02-18 NOTE — PROGRESS NOTE ADULT - PROBLEM SELECTOR PLAN 3
Resolved. Elevated BUN likely etiology in setting of dehydration. Patient hypernatremic on admission to 153 with a free water deficit of 2.4 L. Bicarb downtrending.  -D5W at 80cc/hr for hypernatremia. Resolved. Elevated BUN likely etiology in setting of dehydration. Patient hypernatremic on admission to 153 with a free water deficit of 2.4 L. Bicarb downtrending.  -Stopped fluids, can restart if 150 or higher in AM.

## 2017-02-18 NOTE — PROGRESS NOTE ADULT - PROBLEM SELECTOR PLAN 2
95.7 this morning. No other SIRS criteria. 1/4 SIRS. Sources include dry gangrenous finger, +UA, multiple ulcers. TSH normal. Will give warming blanket. Will empirically treat with IV Vancomycin + Zosyn  - f/u ESR, CRP levels 95.7 this morning. No other SIRS criteria. 1/4 SIRS. Sources include dry gangrenous finger, +UA, multiple ulcers. TSH normal. Will give warming blanket. Will empirically treat with IV Vancomycin + Zosyn  - f/u ESR, CRP levels, cont vanc/zosyn

## 2017-02-18 NOTE — PROGRESS NOTE ADULT - SUBJECTIVE AND OBJECTIVE BOX
INTERVAL HPI/OVERNIGHT EVENTS:  Patient was seen and examined at bedside. RENÉ, slept well overnight.     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  Neurological: AAOx3, CN Grossly intact  Skin: Normal temperature, warm, dry    MEDICATIONS  (STANDING):  diVALproex DR 500milliGRAM(s) Oral two times a day  QUEtiapine 100milliGRAM(s) Oral at bedtime  brimonidine 0.2% Ophthalmic Solution 1Drop(s) Both EYES two times a day  insulin lispro (HumaLOG) corrective regimen sliding scale  SubCutaneous Before meals and at bedtime  thiamine 100milliGRAM(s) Oral daily  folic acid 1milliGRAM(s) Oral daily  multivitamin 1Tablet(s) Oral daily  ascorbic acid 250milliGRAM(s) Oral daily  piperacillin/tazobactam IVPB. 3.375Gram(s) IV Intermittent every 6 hours  vancomycin  IVPB  IV Intermittent   polyethylene glycol 3350 17Gram(s) Oral two times a day  vancomycin  IVPB 750milliGRAM(s) IV Intermittent every 24 hours  silver sulfADIAZINE 1% Cream 1Application(s) Topical two times a day  heparin  Infusion 900Unit(s)/Hr IV Continuous <Continuous>    MEDICATIONS  (PRN):  acetaminophen   Tablet. 650milliGRAM(s) Oral every 6 hours PRN Moderate Pain (4 - 6)  morphine  - Injectable 4milliGRAM(s) IV Push every 10 minutes PRN Severe Pain (7 - 10)  ondansetron Injectable 4milliGRAM(s) IV Push once PRN Nausea and/or Vomiting      Allergies    No Known Allergies    Intolerances        LABS:                        6.8    6.5   )-----------( 60       ( 18 Feb 2017 06:24 )             21.2     18 Feb 2017 06:24    145    |  120    |  24     ----------------------------<  131    4.9     |  18     |  1.17     Ca    7.7        18 Feb 2017 06:24  Phos  1.8       18 Feb 2017 06:24  Mg     1.6       18 Feb 2017 06:24      PT/INR - ( 17 Feb 2017 18:38 )   PT: 20.8 sec;   INR: 1.86          PTT - ( 17 Feb 2017 18:55 )  PTT:50.6 sec      RADIOLOGY & ADDITIONAL TESTS:

## 2017-02-18 NOTE — PROGRESS NOTE ADULT - PROBLEM SELECTOR PLAN 4
new DVT in right mid-distal femoral  - Heparin gtt, check ptts, will stop 4 hours prior to surgery new DVT in right mid-distal femoral  - Heparin gtt restarted

## 2017-02-18 NOTE — PROGRESS NOTE ADULT - PROBLEM SELECTOR PLAN 2
s/p 1 u prbc  NO active bleeding or hemolysis identified  transfuse if Hb < 7   will trend cbc. Heme following. Plan for bone marrow biopsy Tuesday

## 2017-02-18 NOTE — PROGRESS NOTE ADULT - PROBLEM SELECTOR PLAN 4
concern for infection (2nd digit gangrene)  f/u bld cx  c/w Vancomycin + Zosyn  - f/u ESR, CRP right mid-distal femoral  on heparin gtt, holding off on coumadin until Heme does bone marrow biopsy

## 2017-02-18 NOTE — PROGRESS NOTE ADULT - ASSESSMENT
A/P: 71yF s/p L index distal phalanx amputation  - Stable  - Pain Control  - DVT ppx per primary team  - Post op abx  - F/U Labs

## 2017-02-18 NOTE — PROGRESS NOTE ADULT - PROBLEM SELECTOR PLAN 10
regular diet  IVF, d5w @80ccs  monitor/ replete javier     Dispo: d/c to skilled care facility pending medical clearance     FULL CODE regular diet     monitor/ replete javier     Dispo: d/c to skilled care facility pending medical clearance     FULL CODE

## 2017-02-18 NOTE — PROGRESS NOTE ADULT - SUBJECTIVE AND OBJECTIVE BOX
INTERVAL HPI/OVERNIGHT EVENTS: Pt s/p L 2nd digit amputation. Says she feels well. No complaints.    ROS: no CP, SOB, Cough, palpitations, constipation, diarrhea, nausea    MEDICATIONS  (STANDING):  diVALproex DR 500milliGRAM(s) Oral two times a day  QUEtiapine 100milliGRAM(s) Oral at bedtime  brimonidine 0.2% Ophthalmic Solution 1Drop(s) Both EYES two times a day  insulin lispro (HumaLOG) corrective regimen sliding scale  SubCutaneous Before meals and at bedtime  thiamine 100milliGRAM(s) Oral daily  folic acid 1milliGRAM(s) Oral daily  multivitamin 1Tablet(s) Oral daily  ascorbic acid 250milliGRAM(s) Oral daily  piperacillin/tazobactam IVPB. 3.375Gram(s) IV Intermittent every 6 hours  vancomycin  IVPB  IV Intermittent   polyethylene glycol 3350 17Gram(s) Oral two times a day  vancomycin  IVPB 750milliGRAM(s) IV Intermittent every 24 hours  silver sulfADIAZINE 1% Cream 1Application(s) Topical two times a day  dextrose 5%. 1000milliLiter(s) IV Continuous <Continuous>    MEDICATIONS  (PRN):  acetaminophen   Tablet. 650milliGRAM(s) Oral every 6 hours PRN Moderate Pain (4 - 6)  morphine  - Injectable 4milliGRAM(s) IV Push every 10 minutes PRN Severe Pain (7 - 10)  ondansetron Injectable 4milliGRAM(s) IV Push once PRN Nausea and/or Vomiting      Allergies    No Known Allergies    Intolerances            Vital Signs Last 24 Hrs  T(C): 36.3, Max: 36.9 (02-17 @ 16:41)  T(F): 97.4, Max: 98.5 (02-17 @ 16:41)  HR: 59 (52 - 77)  BP: 139/54 (93/46 - 148/70)  RR: 14 (14 - 19)  SpO2: 100% (100% - 100%)    Physical Exam  GEN: NAD, AAOx3, cachectic  HEENT:   Normal oral mucosa, PERRL, EOMI on right eye, blind in left eye	  Neck: Supple,  - JVD   Cardiovascular: Normal S1 S2,  RUTHIE  Respiratory: Lungs clear to auscultation/No Rales, Rhonchi, Wheezing	  Gastrointestinal:  Soft, Non-tender, + BS	  Skin: No rashes, No ecchymoses, No cyanosis.   Extremities: Normal range of motion, No clubbing, cyanosis or edema. Right 4th digit amputation, left 2nd digit amputation.  Neurologic: Non-focal  Psychiatry: A & O x 3, Mood & affect appropriate      LABS:                        6.8    6.5   )-----------( 60       ( 18 Feb 2017 06:24 )             21.2     18 Feb 2017 06:24    145    |  120    |  24     ----------------------------<  131    4.9     |  18     |  1.17     Ca    7.7        18 Feb 2017 06:24  Phos  1.8       18 Feb 2017 06:24  Mg     1.6       18 Feb 2017 06:24      PT/INR - ( 17 Feb 2017 18:38 )   PT: 20.8 sec;   INR: 1.86          PTT - ( 17 Feb 2017 18:55 )  PTT:50.6 sec

## 2017-02-19 LAB
-  AMPICILLIN/SULBACTAM: SIGNIFICANT CHANGE UP
-  AMPICILLIN: SIGNIFICANT CHANGE UP
-  CEFAZOLIN: SIGNIFICANT CHANGE UP
-  CEFTRIAXONE: SIGNIFICANT CHANGE UP
-  CIPROFLOXACIN: SIGNIFICANT CHANGE UP
-  GENTAMICIN: SIGNIFICANT CHANGE UP
-  NITROFURANTOIN: SIGNIFICANT CHANGE UP
-  PIPERACILLIN/TAZOBACTAM: SIGNIFICANT CHANGE UP
-  TOBRAMYCIN: SIGNIFICANT CHANGE UP
-  TRIMETHOPRIM/SULFAMETHOXAZOLE: SIGNIFICANT CHANGE UP
ANION GAP SERPL CALC-SCNC: 6 MMOL/L — LOW (ref 9–16)
ANION GAP SERPL CALC-SCNC: 6 MMOL/L — LOW (ref 9–16)
APTT BLD: 39.2 SEC — HIGH (ref 27.5–37.4)
APTT BLD: 60.6 SEC — HIGH (ref 27.5–37.4)
APTT BLD: 72.9 SEC — HIGH (ref 27.5–37.4)
B2 GLYCOPROT1 AB SER QL: NEGATIVE — SIGNIFICANT CHANGE UP
BUN SERPL-MCNC: 18 MG/DL — SIGNIFICANT CHANGE UP (ref 7–23)
BUN SERPL-MCNC: 19 MG/DL — SIGNIFICANT CHANGE UP (ref 7–23)
CALCIUM SERPL-MCNC: 7.2 MG/DL — LOW (ref 8.5–10.5)
CALCIUM SERPL-MCNC: 7.7 MG/DL — LOW (ref 8.5–10.5)
CARDIOLIPIN AB SER-ACNC: NEGATIVE — SIGNIFICANT CHANGE UP
CHLORIDE SERPL-SCNC: 119 MMOL/L — HIGH (ref 96–108)
CHLORIDE SERPL-SCNC: 119 MMOL/L — HIGH (ref 96–108)
CO2 SERPL-SCNC: 17 MMOL/L — LOW (ref 22–31)
CO2 SERPL-SCNC: 18 MMOL/L — LOW (ref 22–31)
CREAT SERPL-MCNC: 1.16 MG/DL — SIGNIFICANT CHANGE UP (ref 0.5–1.3)
CREAT SERPL-MCNC: 1.21 MG/DL — SIGNIFICANT CHANGE UP (ref 0.5–1.3)
CULTURE RESULTS: SIGNIFICANT CHANGE UP
GLUCOSE SERPL-MCNC: 116 MG/DL — HIGH (ref 70–99)
GLUCOSE SERPL-MCNC: 81 MG/DL — SIGNIFICANT CHANGE UP (ref 70–99)
HCT VFR BLD CALC: 28.1 % — LOW (ref 34.5–45)
HCT VFR BLD CALC: 28.9 % — LOW (ref 34.5–45)
HGB BLD-MCNC: 9.4 G/DL — LOW (ref 11.5–15.5)
HGB BLD-MCNC: 9.5 G/DL — LOW (ref 11.5–15.5)
MAGNESIUM SERPL-MCNC: 2 MG/DL — SIGNIFICANT CHANGE UP (ref 1.6–2.4)
MCHC RBC-ENTMCNC: 27.9 PG — SIGNIFICANT CHANGE UP (ref 27–34)
MCHC RBC-ENTMCNC: 28.1 PG — SIGNIFICANT CHANGE UP (ref 27–34)
MCHC RBC-ENTMCNC: 32.9 G/DL — SIGNIFICANT CHANGE UP (ref 32–36)
MCHC RBC-ENTMCNC: 33.5 G/DL — SIGNIFICANT CHANGE UP (ref 32–36)
MCV RBC AUTO: 84.1 FL — SIGNIFICANT CHANGE UP (ref 80–100)
MCV RBC AUTO: 85 FL — SIGNIFICANT CHANGE UP (ref 80–100)
METHOD TYPE: SIGNIFICANT CHANGE UP
ORGANISM # SPEC MICROSCOPIC CNT: SIGNIFICANT CHANGE UP
ORGANISM # SPEC MICROSCOPIC CNT: SIGNIFICANT CHANGE UP
PHOSPHATE SERPL-MCNC: 2.5 MG/DL — SIGNIFICANT CHANGE UP (ref 2.5–4.5)
PLATELET # BLD AUTO: 56 K/UL — LOW (ref 150–400)
PLATELET # BLD AUTO: 61 K/UL — LOW (ref 150–400)
POTASSIUM SERPL-MCNC: 5.5 MMOL/L — HIGH (ref 3.5–5.3)
POTASSIUM SERPL-MCNC: 6 MMOL/L — HIGH (ref 3.5–5.3)
POTASSIUM SERPL-SCNC: 5.5 MMOL/L — HIGH (ref 3.5–5.3)
POTASSIUM SERPL-SCNC: 6 MMOL/L — HIGH (ref 3.5–5.3)
RBC # BLD: 3.34 M/UL — LOW (ref 3.8–5.2)
RBC # BLD: 3.4 M/UL — LOW (ref 3.8–5.2)
RBC # FLD: 18.6 % — HIGH (ref 10.3–16.9)
RBC # FLD: 18.7 % — HIGH (ref 10.3–16.9)
SODIUM SERPL-SCNC: 142 MMOL/L — SIGNIFICANT CHANGE UP (ref 135–145)
SODIUM SERPL-SCNC: 143 MMOL/L — SIGNIFICANT CHANGE UP (ref 135–145)
SPECIMEN SOURCE: SIGNIFICANT CHANGE UP
VANCOMYCIN TROUGH SERPL-MCNC: 15.5 UG/ML — SIGNIFICANT CHANGE UP (ref 10–20)
WBC # BLD: 4.8 K/UL — SIGNIFICANT CHANGE UP (ref 3.8–10.5)
WBC # BLD: 5.1 K/UL — SIGNIFICANT CHANGE UP (ref 3.8–10.5)
WBC # FLD AUTO: 4.8 K/UL — SIGNIFICANT CHANGE UP (ref 3.8–10.5)
WBC # FLD AUTO: 5.1 K/UL — SIGNIFICANT CHANGE UP (ref 3.8–10.5)

## 2017-02-19 RX ORDER — SODIUM POLYSTYRENE SULFONATE 4.1 MEQ/G
30 POWDER, FOR SUSPENSION ORAL ONCE
Qty: 0 | Refills: 0 | Status: COMPLETED | OUTPATIENT
Start: 2017-02-19 | End: 2017-02-19

## 2017-02-19 RX ADMIN — Medication 100 MILLIGRAM(S): at 15:22

## 2017-02-19 RX ADMIN — Medication 250 MILLIGRAM(S): at 11:08

## 2017-02-19 RX ADMIN — Medication 1 MILLIGRAM(S): at 11:09

## 2017-02-19 RX ADMIN — DIVALPROEX SODIUM 500 MILLIGRAM(S): 500 TABLET, DELAYED RELEASE ORAL at 05:05

## 2017-02-19 RX ADMIN — PIPERACILLIN AND TAZOBACTAM 200 GRAM(S): 4; .5 INJECTION, POWDER, LYOPHILIZED, FOR SOLUTION INTRAVENOUS at 20:59

## 2017-02-19 RX ADMIN — QUETIAPINE FUMARATE 100 MILLIGRAM(S): 200 TABLET, FILM COATED ORAL at 21:15

## 2017-02-19 RX ADMIN — Medication 1 TABLET(S): at 11:09

## 2017-02-19 RX ADMIN — POLYETHYLENE GLYCOL 3350 17 GRAM(S): 17 POWDER, FOR SOLUTION ORAL at 05:05

## 2017-02-19 RX ADMIN — SODIUM POLYSTYRENE SULFONATE 30 GRAM(S): 4.1 POWDER, FOR SUSPENSION ORAL at 21:05

## 2017-02-19 RX ADMIN — Medication 250 MILLIGRAM(S): at 15:21

## 2017-02-19 RX ADMIN — PIPERACILLIN AND TAZOBACTAM 200 GRAM(S): 4; .5 INJECTION, POWDER, LYOPHILIZED, FOR SOLUTION INTRAVENOUS at 15:22

## 2017-02-19 RX ADMIN — BRIMONIDINE TARTRATE 1 DROP(S): 2 SOLUTION/ DROPS OPHTHALMIC at 14:00

## 2017-02-19 RX ADMIN — Medication 1 APPLICATION(S): at 11:05

## 2017-02-19 RX ADMIN — Medication 2: at 21:13

## 2017-02-19 RX ADMIN — Medication 1 APPLICATION(S): at 21:16

## 2017-02-19 RX ADMIN — BRIMONIDINE TARTRATE 1 DROP(S): 2 SOLUTION/ DROPS OPHTHALMIC at 21:15

## 2017-02-19 RX ADMIN — PIPERACILLIN AND TAZOBACTAM 200 GRAM(S): 4; .5 INJECTION, POWDER, LYOPHILIZED, FOR SOLUTION INTRAVENOUS at 05:05

## 2017-02-19 RX ADMIN — POLYETHYLENE GLYCOL 3350 17 GRAM(S): 17 POWDER, FOR SOLUTION ORAL at 18:42

## 2017-02-19 RX ADMIN — DIVALPROEX SODIUM 500 MILLIGRAM(S): 500 TABLET, DELAYED RELEASE ORAL at 18:41

## 2017-02-19 NOTE — PROGRESS NOTE ADULT - SUBJECTIVE AND OBJECTIVE BOX
INTERVAL HPI/OVERNIGHT EVENTS:  No complaints, says she feels better, denies pain in extremities. Currently eating. Discussed BM bx on Tuesday, she agrees.    ROS: no CP, SOB, Cough, palpitations, constipation, diarrhea, nausea    MEDICATIONS  (STANDING):  diVALproex DR 500milliGRAM(s) Oral two times a day  QUEtiapine 100milliGRAM(s) Oral at bedtime  brimonidine 0.2% Ophthalmic Solution 1Drop(s) Both EYES two times a day  insulin lispro (HumaLOG) corrective regimen sliding scale  SubCutaneous Before meals and at bedtime  thiamine 100milliGRAM(s) Oral daily  folic acid 1milliGRAM(s) Oral daily  multivitamin 1Tablet(s) Oral daily  ascorbic acid 250milliGRAM(s) Oral daily  piperacillin/tazobactam IVPB. 3.375Gram(s) IV Intermittent every 6 hours  vancomycin  IVPB  IV Intermittent   polyethylene glycol 3350 17Gram(s) Oral two times a day  vancomycin  IVPB 750milliGRAM(s) IV Intermittent every 24 hours  silver sulfADIAZINE 1% Cream 1Application(s) Topical two times a day  heparin  Infusion 900Unit(s)/Hr IV Continuous <Continuous>    MEDICATIONS  (PRN):  acetaminophen   Tablet. 650milliGRAM(s) Oral every 6 hours PRN Moderate Pain (4 - 6)  morphine  - Injectable 4milliGRAM(s) IV Push every 10 minutes PRN Severe Pain (7 - 10)  ondansetron Injectable 4milliGRAM(s) IV Push once PRN Nausea and/or Vomiting      Allergies    No Known Allergies    Intolerances            Vital Signs Last 24 Hrs  T(F): 98, Max: 98 (02-19 @ 11:44)  HR: 83 (49 - 83)  BP: 106/65 (106/65 - 154/65)  RR: 16 (14 - 17)  SpO2: 93% (93% - 100%)    Physical Exam  GEN: NAD, AAOx3, cachectic  HEENT:   Normal oral mucosa, Left eye blindness	  Neck: Supple,  - JVD   Cardiovascular: Normal S1 S2,  RUTHIE   Respiratory: Lungs clear to auscultation/No Rales, Rhonchi, Wheezing	  Gastrointestinal:  Soft, Non-tender, + BS	  Skin: No rashes, No ecchymoses, No cyanosis  Extremities: Normal range of motion, No clubbing, cyanosis or edema, Right 4th digit amputation, left 2nd digit amputation.  Neurologic: Non-focal, LE weakness  Psychiatry: A & O x 3, Mood & affect appropriate      LABS:                        9.4    4.8   )-----------( 61       ( 19 Feb 2017 06:33 )             28.1     19 Feb 2017 06:33    143    |  119    |  19     ----------------------------<  116    5.5     |  18     |  1.21     Ca    7.2        19 Feb 2017 06:33  Phos  2.5       19 Feb 2017 06:33  Mg     2.0       19 Feb 2017 06:33      PT/INR - ( 17 Feb 2017 18:38 )   PT: 20.8 sec;   INR: 1.86          PTT - ( 19 Feb 2017 02:29 )  PTT:60.6 sec

## 2017-02-19 NOTE — PROGRESS NOTE ADULT - PROBLEM SELECTOR PLAN 4
right mid-distal femoral  on heparin gtt, holding off on coumadin until Heme does bone marrow biopsy

## 2017-02-19 NOTE — PROGRESS NOTE ADULT - SUBJECTIVE AND OBJECTIVE BOX
Today no new complaints. S/p right index finger amputation yesterday. No bleeding    PHYSICAL EXAM:    Vital Signs Last 24 Hrs  T(C): 35.7, Max: 36.3 (02-18 @ 14:15)  T(F): 96.2, Max: 97.4 (02-18 @ 14:15)  HR: 65 (49 - 65)  BP: 128/59 (93/46 - 154/65)  BP(mean): --  RR: 17 (14 - 17)  SpO2: 97% (97% - 100%)  Gen: well developed, well nourished, comfortable  HEENT: normocephalic/atraumatic, no conjunctival pallor, no scleral icterus, no oral thrush/mucosal bleeding/mucositis  Neck: supple, no masses, no JVD  Cardiovascular: RR, nl S1S2, no murmurs/rubs/gallops  Respiratory: clear air entry b/l  Gastrointestinal: BS+, soft, NT/ND, no masses, no splenomegaly, no hepatomegaly, no evidence for ascites  Extremities: no clubbing/cyanosis, no edema, no calf tenderness  Neurological: CN 2-12 grossly intact, no focal deficits  Lymph Nodes:  no cervical/supraclavicular LAD, no axillary/groin LAD    LABS:             19 Feb 2017 06:33    143    |  119    |  19     ----------------------------<  116    5.5     |  18     |  1.21     Ca    7.2        19 Feb 2017 06:33  Phos  2.5       19 Feb 2017 06:33  Mg     2.0       19 Feb 2017 06:33                          9.4    4.8   )-----------( 61       ( 19 Feb 2017 06:33 )             28.1           RADIOLOGY & ADDITIONAL STUDIES:    Assessment and Recommendation:   · Assessment		  70 yo F last seen July 2016 for pancytopenia. Pt admitted for left 2nd finger pain.     Problem/Recommendation - 1:  Problem: Pancytopenia. Recommendation: uncertain etiology at this time. Pt has had fluctuating pancytopenia for the past 2 years. No clear cause on peripheral smear. Pt will need a bone marrow biopsy. I will perform one on Tuesday if pt continues to be admitted and if not she may get it as outpt upon follow up with Dr. Lopez and me. Awaiting cryoglobulins in the setting of HCV.    Problem/Recommendation - 2:  ·  Problem: Coagulopathy.  Recommendation: elevated INR from unknown cause. Will get mixing study on tuesday. No increased bleeding after right finger amputation.

## 2017-02-20 LAB
ANION GAP SERPL CALC-SCNC: 8 MMOL/L — LOW (ref 9–16)
APTT BLD: 31.8 SEC — SIGNIFICANT CHANGE UP (ref 27.5–37.4)
APTT BLD: 34.1 SEC — SIGNIFICANT CHANGE UP (ref 27.5–37.4)
APTT BLD: >200 SEC — CRITICAL HIGH (ref 27.5–37.4)
BASOPHILS NFR BLD AUTO: 0.2 % — SIGNIFICANT CHANGE UP (ref 0–2)
BUN SERPL-MCNC: 21 MG/DL — SIGNIFICANT CHANGE UP (ref 7–23)
CALCIUM SERPL-MCNC: 8 MG/DL — LOW (ref 8.5–10.5)
CHLORIDE SERPL-SCNC: 120 MMOL/L — HIGH (ref 96–108)
CO2 SERPL-SCNC: 20 MMOL/L — LOW (ref 22–31)
CREAT SERPL-MCNC: 1.25 MG/DL — SIGNIFICANT CHANGE UP (ref 0.5–1.3)
EOSINOPHIL NFR BLD AUTO: 0.9 % — SIGNIFICANT CHANGE UP (ref 0–6)
FIBRINOGEN PPP-MCNC: 532 MG/DL — HIGH (ref 255–510)
GLUCOSE SERPL-MCNC: 69 MG/DL — LOW (ref 70–99)
HCT VFR BLD CALC: 30.6 % — LOW (ref 34.5–45)
HGB BLD-MCNC: 9.9 G/DL — LOW (ref 11.5–15.5)
LYMPHOCYTES # BLD AUTO: 29.4 % — SIGNIFICANT CHANGE UP (ref 13–44)
MAGNESIUM SERPL-MCNC: 2.1 MG/DL — SIGNIFICANT CHANGE UP (ref 1.6–2.4)
MCHC RBC-ENTMCNC: 27.6 PG — SIGNIFICANT CHANGE UP (ref 27–34)
MCHC RBC-ENTMCNC: 32.4 G/DL — SIGNIFICANT CHANGE UP (ref 32–36)
MCV RBC AUTO: 85.2 FL — SIGNIFICANT CHANGE UP (ref 80–100)
MONOCYTES NFR BLD AUTO: 7 % — SIGNIFICANT CHANGE UP (ref 2–14)
NEUTROPHILS NFR BLD AUTO: 62.5 % — SIGNIFICANT CHANGE UP (ref 43–77)
PLATELET # BLD AUTO: 66 K/UL — LOW (ref 150–400)
POTASSIUM SERPL-MCNC: 5.2 MMOL/L — SIGNIFICANT CHANGE UP (ref 3.5–5.3)
POTASSIUM SERPL-SCNC: 5.2 MMOL/L — SIGNIFICANT CHANGE UP (ref 3.5–5.3)
RBC # BLD: 3.59 M/UL — LOW (ref 3.8–5.2)
RBC # FLD: 18.3 % — HIGH (ref 10.3–16.9)
SODIUM SERPL-SCNC: 148 MMOL/L — HIGH (ref 135–145)
WBC # BLD: 5.4 K/UL — SIGNIFICANT CHANGE UP (ref 3.8–10.5)
WBC # FLD AUTO: 5.4 K/UL — SIGNIFICANT CHANGE UP (ref 3.8–10.5)

## 2017-02-20 RX ORDER — HEPARIN SODIUM 5000 [USP'U]/ML
900 INJECTION INTRAVENOUS; SUBCUTANEOUS
Qty: 25000 | Refills: 0 | Status: DISCONTINUED | OUTPATIENT
Start: 2017-02-20 | End: 2017-02-20

## 2017-02-20 RX ORDER — HEPARIN SODIUM 5000 [USP'U]/ML
1000 INJECTION INTRAVENOUS; SUBCUTANEOUS
Qty: 25000 | Refills: 0 | Status: DISCONTINUED | OUTPATIENT
Start: 2017-02-20 | End: 2017-02-21

## 2017-02-20 RX ADMIN — QUETIAPINE FUMARATE 100 MILLIGRAM(S): 200 TABLET, FILM COATED ORAL at 23:49

## 2017-02-20 RX ADMIN — BRIMONIDINE TARTRATE 1 DROP(S): 2 SOLUTION/ DROPS OPHTHALMIC at 14:39

## 2017-02-20 RX ADMIN — POLYETHYLENE GLYCOL 3350 17 GRAM(S): 17 POWDER, FOR SOLUTION ORAL at 06:01

## 2017-02-20 RX ADMIN — HEPARIN SODIUM 10 UNIT(S)/HR: 5000 INJECTION INTRAVENOUS; SUBCUTANEOUS at 23:21

## 2017-02-20 RX ADMIN — BRIMONIDINE TARTRATE 1 DROP(S): 2 SOLUTION/ DROPS OPHTHALMIC at 23:50

## 2017-02-20 RX ADMIN — Medication 1 APPLICATION(S): at 23:50

## 2017-02-20 RX ADMIN — DIVALPROEX SODIUM 500 MILLIGRAM(S): 500 TABLET, DELAYED RELEASE ORAL at 17:49

## 2017-02-20 RX ADMIN — DIVALPROEX SODIUM 500 MILLIGRAM(S): 500 TABLET, DELAYED RELEASE ORAL at 06:01

## 2017-02-20 RX ADMIN — Medication 1 MILLIGRAM(S): at 12:39

## 2017-02-20 RX ADMIN — PIPERACILLIN AND TAZOBACTAM 200 GRAM(S): 4; .5 INJECTION, POWDER, LYOPHILIZED, FOR SOLUTION INTRAVENOUS at 03:04

## 2017-02-20 RX ADMIN — Medication 2: at 17:08

## 2017-02-20 RX ADMIN — Medication 1 TABLET(S): at 12:39

## 2017-02-20 RX ADMIN — Medication 100 MILLIGRAM(S): at 12:39

## 2017-02-20 RX ADMIN — Medication 1 APPLICATION(S): at 10:43

## 2017-02-20 RX ADMIN — HEPARIN SODIUM 11 UNIT(S)/HR: 5000 INJECTION INTRAVENOUS; SUBCUTANEOUS at 03:04

## 2017-02-20 RX ADMIN — PIPERACILLIN AND TAZOBACTAM 200 GRAM(S): 4; .5 INJECTION, POWDER, LYOPHILIZED, FOR SOLUTION INTRAVENOUS at 10:43

## 2017-02-20 RX ADMIN — Medication 250 MILLIGRAM(S): at 12:39

## 2017-02-20 NOTE — PROGRESS NOTE ADULT - PROBLEM SELECTOR PLAN 7
Unclear etiology at this time; Heme;Onc following for intermittent pancytopenia  -trend CBC, monitor for signs of anemia/bleeding.  -Bone Marrow biopsy on tuesday, mixing study on tuesday.

## 2017-02-20 NOTE — PHYSICAL THERAPY INITIAL EVALUATION ADULT - ADDITIONAL COMMENTS
Patient reports needing assistance for all ADLs/IADLs prior to admission. Patient states she has a home health aid 7 days/week 5 hours/day. Patient reports ambulating with a walker for household distances and a wheelchair for community distances. Patient does not report history of falls at this time.

## 2017-02-20 NOTE — PROGRESS NOTE ADULT - PROBLEM SELECTOR PLAN 8
c/w home Depakote and Seroquel    #Abdominal Distension: nontender, no guarding. Xray with dilated loops of bowel. Patient to be given enema and started on aggressive bowel regimen.   -  CT showing some ascites, severe anasarca  -continue bowel regiment.

## 2017-02-20 NOTE — PROGRESS NOTE ADULT - SUBJECTIVE AND OBJECTIVE BOX
INTERVAL HPI/OVERNIGHT EVENTS:  Patient was seen and examined at bedside.      VITAL SIGNS:  T(F): 97.2  HR: 82  BP: 105/54  RR: 16  SpO2: 97%  Wt(kg): --    PHYSICAL EXAM:    Constitutional: WDWN, NAD  Eyes: PERRL, EOMI, sclera non-icteric  Neck: supple, trachea midline, no masses, no JVD  Respiratory: CTA b/l, good air entry b/l, no wheezing, rhonchi, rales, without accessory muscle use and no intercostal retractions  Cardiovascular: RRR, normal S1S2, no M/R/G  Gastrointestinal: soft, NTND, no masses palpable, BS normal  Extremities: Warm, well perfused, pulses equal bilateral upper and lower extremities, no edema, no clubbing  Neurological: AAOx3, CN Grossly intact  Skin: Normal temperature, warm, dry    MEDICATIONS  (STANDING):  diVALproex DR 500milliGRAM(s) Oral two times a day  QUEtiapine 100milliGRAM(s) Oral at bedtime  brimonidine 0.2% Ophthalmic Solution 1Drop(s) Both EYES two times a day  insulin lispro (HumaLOG) corrective regimen sliding scale  SubCutaneous Before meals and at bedtime  thiamine 100milliGRAM(s) Oral daily  folic acid 1milliGRAM(s) Oral daily  multivitamin 1Tablet(s) Oral daily  ascorbic acid 250milliGRAM(s) Oral daily  piperacillin/tazobactam IVPB. 3.375Gram(s) IV Intermittent every 6 hours  vancomycin  IVPB  IV Intermittent   polyethylene glycol 3350 17Gram(s) Oral two times a day  vancomycin  IVPB 750milliGRAM(s) IV Intermittent every 24 hours  silver sulfADIAZINE 1% Cream 1Application(s) Topical two times a day    MEDICATIONS  (PRN):  acetaminophen   Tablet. 650milliGRAM(s) Oral every 6 hours PRN Moderate Pain (4 - 6)  morphine  - Injectable 4milliGRAM(s) IV Push every 10 minutes PRN Severe Pain (7 - 10)  ondansetron Injectable 4milliGRAM(s) IV Push once PRN Nausea and/or Vomiting      Allergies    No Known Allergies    Intolerances        LABS:                        9.4    4.8   )-----------( 61       ( 19 Feb 2017 06:33 )             28.1     20 Feb 2017 06:23    148    |  120    |  21     ----------------------------<  69     5.2     |  20     |  1.25     Ca    8.0        20 Feb 2017 06:23  Phos  2.5       19 Feb 2017 06:33  Mg     2.1       20 Feb 2017 06:23      PT/INR - ( 20 Feb 2017 00:53 )   PT: 20.8 sec;   INR: 1.86          PTT - ( 20 Feb 2017 07:06 )  PTT:>200.0 sec      RADIOLOGY & ADDITIONAL TESTS: INTERVAL HPI/OVERNIGHT EVENTS:  Patient was seen and examined at bedside.  RENÉ, patient feels signifcantly better than previous.     VITAL SIGNS:  T(F): 97.2  HR: 82  BP: 105/54  RR: 16  SpO2: 97%  Wt(kg): --    PHYSICAL EXAM:    Constitutional: WDWN, NAD  Eyes: PERRL, EOMI, sclera non-icteric  Neck: supple, trachea midline, no masses, no JVD  Respiratory: CTA b/l, good air entry b/l, no wheezing, rhonchi, rales, without accessory muscle use and no intercostal retractions  Cardiovascular: RRR, normal S1S2, no M/R/G  Gastrointestinal: soft, NTND, no masses palpable, BS normal  Extremities: Warm, well perfused, pulses equal bilateral upper and lower extremities, Left index finger amputation, dressed with gauze  Neurological: AAOx3, CN Grossly intact  Skin: Normal temperature, warm, dry    MEDICATIONS  (STANDING):  diVALproex DR 500milliGRAM(s) Oral two times a day  QUEtiapine 100milliGRAM(s) Oral at bedtime  brimonidine 0.2% Ophthalmic Solution 1Drop(s) Both EYES two times a day  insulin lispro (HumaLOG) corrective regimen sliding scale  SubCutaneous Before meals and at bedtime  thiamine 100milliGRAM(s) Oral daily  folic acid 1milliGRAM(s) Oral daily  multivitamin 1Tablet(s) Oral daily  ascorbic acid 250milliGRAM(s) Oral daily  piperacillin/tazobactam IVPB. 3.375Gram(s) IV Intermittent every 6 hours  vancomycin  IVPB  IV Intermittent   polyethylene glycol 3350 17Gram(s) Oral two times a day  vancomycin  IVPB 750milliGRAM(s) IV Intermittent every 24 hours  silver sulfADIAZINE 1% Cream 1Application(s) Topical two times a day    MEDICATIONS  (PRN):  acetaminophen   Tablet. 650milliGRAM(s) Oral every 6 hours PRN Moderate Pain (4 - 6)  morphine  - Injectable 4milliGRAM(s) IV Push every 10 minutes PRN Severe Pain (7 - 10)  ondansetron Injectable 4milliGRAM(s) IV Push once PRN Nausea and/or Vomiting      Allergies    No Known Allergies    Intolerances        LABS:                        9.4    4.8   )-----------( 61       ( 19 Feb 2017 06:33 )             28.1     20 Feb 2017 06:23    148    |  120    |  21     ----------------------------<  69     5.2     |  20     |  1.25     Ca    8.0        20 Feb 2017 06:23  Phos  2.5       19 Feb 2017 06:33  Mg     2.1       20 Feb 2017 06:23      PT/INR - ( 20 Feb 2017 00:53 )   PT: 20.8 sec;   INR: 1.86          PTT - ( 20 Feb 2017 07:06 )  PTT:>200.0 sec      RADIOLOGY & ADDITIONAL TESTS:

## 2017-02-20 NOTE — PHYSICAL THERAPY INITIAL EVALUATION ADULT - PHYSICAL ASSIST/NONPHYSICAL ASSIST, REHAB EVAL
nonverbal cues (demo/gestures)/verbal cues/Verbal/manual cueing to use handrail to help roll trunk onto side.

## 2017-02-20 NOTE — PROGRESS NOTE ADULT - ASSESSMENT
72 y/o with extensive PVD, psychiatric illness, multiple ulcers, presents with finger pain 2/2 dry gangrene. 72 y/o with extensive PVD, psychiatric illness, multiple ulcers, presents with finger pain 2/2 dry gangrene now s/p amputation with pain adequately controlled.

## 2017-02-20 NOTE — PROGRESS NOTE ADULT - PROBLEM SELECTOR PLAN 2
95.7 this morning. No other SIRS criteria. 1/4 SIRS. Sources include dry gangrenous finger, +UA, multiple ulcers. TSH normal. Will give warming blanket. Will empirically treat with IV Vancomycin + Zosyn  - f/u ESR, CRP levels, cont vanc/zosyn

## 2017-02-20 NOTE — PROGRESS NOTE ADULT - SUBJECTIVE AND OBJECTIVE BOX
SUBJECTIVE: Patient seen and examined.  No issues overnight. Pain well controlled. Denies CP/SOB    OBJECTIVE:     Vital Signs Last 24 Hrs  T(C): 36.1, Max: 37.4 (02-19 @ 16:59)  T(F): 97, Max: 99.3 (02-19 @ 16:59)  HR: 71 (70 - 82)  BP: 167/72 (105/54 - 167/72)  BP(mean): --  RR: 16 (16 - 17)  SpO2: 98% (97% - 98%)                Dressing: clean/dry/intact            Motor exam: intact 2nd digit               LABS:                        9.4    4.8   )-----------( 61       ( 19 Feb 2017 06:33 )             28.1     20 Feb 2017 06:23    148    |  120    |  21     ----------------------------<  69     5.2     |  20     |  1.25     Ca    8.0        20 Feb 2017 06:23  Phos  2.5       19 Feb 2017 06:33  Mg     2.1       20 Feb 2017 06:23        MEDICATIONS:    Anticoagulation:  heparin  Infusion 900Unit(s)/Hr IV Continuous <Continuous>      Antibiotics:         A/P :   s/p   L 2nd dip amp  -    DVT ppx: heparin  Infusion 900Unit(s)/Hr IV Continuous <Continuous>    =-dressing changed today.

## 2017-02-20 NOTE — PROGRESS NOTE ADULT - PROBLEM SELECTOR PLAN 10
regular diet     monitor/ replete javier     Dispo: d/c to skilled care facility pending medical clearance     FULL CODE

## 2017-02-20 NOTE — PHYSICAL THERAPY INITIAL EVALUATION ADULT - SITTING BALANCE: STATIC
Unable to assess 2/2 to patient resisting supine to sit bed mobility. Unable to assess 2/2 to patient resistant against dangling edge of bed s/p supine to sit transfer.

## 2017-02-20 NOTE — PROGRESS NOTE ADULT - PROBLEM SELECTOR PLAN 3
Resolved. Elevated BUN likely etiology in setting of dehydration. Patient hypernatremic on admission to 153 with a free water deficit of 2.4 L. Bicarb downtrending.  -Stopped fluids, can restart if 150 or higher in AM.

## 2017-02-20 NOTE — PHYSICAL THERAPY INITIAL EVALUATION ADULT - PERTINENT HX OF CURRENT PROBLEM, REHAB EVAL
70 y/o female (poor historian) with pmhx of bipolar dx, schizophrenia, parkinson's, DM, PVD with suspected Raynaud's s/p right middle finger amputation (10/08/16) presents with left 2nd digit pain x 1 month. Patient noted her left 2nd finger to be black, discolored, causing her excruciating soreness and pain. Pain characterized 9/10, non radiating, slightly improved with Advil with no motor or sensation deficits. Refer to H&P on sunrise for remaining.

## 2017-02-20 NOTE — PHYSICAL THERAPY INITIAL EVALUATION ADULT - PLANNED THERAPY INTERVENTIONS, PT EVAL
transfer training/stair negotiation/gait training/bed mobility training/balance training/strengthening

## 2017-02-20 NOTE — PROGRESS NOTE ADULT - SUBJECTIVE AND OBJECTIVE BOX
INTERVAL HPI/OVERNIGHT EVENTS: No complaints. Denies pain. Understands awaiting BM biopsy tomorrow.    ROS: no CP, SOB, Cough, palpitations, constipation, diarrhea, nausea    MEDICATIONS  (STANDING):  diVALproex DR 500milliGRAM(s) Oral two times a day  QUEtiapine 100milliGRAM(s) Oral at bedtime  brimonidine 0.2% Ophthalmic Solution 1Drop(s) Both EYES two times a day  insulin lispro (HumaLOG) corrective regimen sliding scale  SubCutaneous Before meals and at bedtime  thiamine 100milliGRAM(s) Oral daily  folic acid 1milliGRAM(s) Oral daily  multivitamin 1Tablet(s) Oral daily  ascorbic acid 250milliGRAM(s) Oral daily  polyethylene glycol 3350 17Gram(s) Oral two times a day  silver sulfADIAZINE 1% Cream 1Application(s) Topical two times a day  heparin  Infusion 900Unit(s)/Hr IV Continuous <Continuous>    MEDICATIONS  (PRN):  acetaminophen   Tablet. 650milliGRAM(s) Oral every 6 hours PRN Moderate Pain (4 - 6)  morphine  - Injectable 4milliGRAM(s) IV Push every 10 minutes PRN Severe Pain (7 - 10)  ondansetron Injectable 4milliGRAM(s) IV Push once PRN Nausea and/or Vomiting      Allergies    No Known Allergies    Intolerances            Vital Signs Last 24 Hrs  T(C): 36.1, Max: 37.4 (02-19 @ 16:59)  T(F): 97, Max: 99.3 (02-19 @ 16:59)  HR: 71 (70 - 82)  BP: 167/72 (105/54 - 167/72)  BP(mean): --  RR: 16 (16 - 17)  SpO2: 98% (97% - 98%)    Physical Exam  GEN: NAD, AAOx3  HEENT:   Normal oral mucosa, PERRL, EOMI, Left eye blind	  Neck: Supple,  - JVD   Cardiovascular: Normal S1 S2, RUTHIE   Respiratory: Lungs clear to auscultation/No Rales, Rhonchi, Wheezing	  Gastrointestinal:  Soft, Non-tender, + BS, distended	  Skin: No rashes, No ecchymoses, No cyanosis  Extremities: Normal range of motion, No clubbing, cyanosis or edema. left 2nd digit amputation dressing CDI, right 3rd digit amputation  Neurologic: Non-focal  Psychiatry: A & O x 3, Mood & affect appropriate      LABS:                        9.4    4.8   )-----------( 61       ( 19 Feb 2017 06:33 )             28.1     20 Feb 2017 06:23    148    |  120    |  21     ----------------------------<  69     5.2     |  20     |  1.25     Ca    8.0        20 Feb 2017 06:23  Phos  2.5       19 Feb 2017 06:33  Mg     2.1       20 Feb 2017 06:23      PT/INR - ( 20 Feb 2017 00:53 )   PT: 20.8 sec;   INR: 1.86          PTT - ( 20 Feb 2017 11:02 )  PTT:31.8 sec

## 2017-02-20 NOTE — PROGRESS NOTE ADULT - PROBLEM SELECTOR PLAN 1
dry gangrene of left 2nd digit. distal phalange black, necrotic, painful to touch. hx of extensive PVD with previous amputation of right middle finger. s/p amputation, tolerated well. Morphine for pain control.

## 2017-02-20 NOTE — PHYSICAL THERAPY INITIAL EVALUATION ADULT - LEVEL OF INDEPENDENCE: SUPINE/SIT, REHAB EVAL
maximum assist (25% patients effort)/Patient able to bring B/L LE towards edge of bed, however max A x 1 for trunk support and to bring B/L LE off edge of bed.

## 2017-02-21 ENCOUNTER — RESULT REVIEW (OUTPATIENT)
Age: 72
End: 2017-02-21

## 2017-02-21 LAB
APTT 50/50 2HOUR INCUB: 38.1 SEC — HIGH (ref 27.5–37.4)
APTT BLD: 29.8 SECS — SIGNIFICANT CHANGE UP (ref 27.5–37.4)
APTT BLD: 35.8 SEC — SIGNIFICANT CHANGE UP (ref 27.5–37.4)
APTT BLD: 75.7 SEC — HIGH (ref 27.5–37.4)
CULTURE RESULTS: SIGNIFICANT CHANGE UP
CULTURE RESULTS: SIGNIFICANT CHANGE UP
DRVVT SCREEN TO CONFIRM RATIO: SIGNIFICANT CHANGE UP
HCT VFR BLD CALC: 30.1 % — LOW (ref 34.5–45)
HGB BLD-MCNC: 9.7 G/DL — LOW (ref 11.5–15.5)
LA NT DPL PPP QL: 47.6 SEC — SIGNIFICANT CHANGE UP
MCHC RBC-ENTMCNC: 27.7 PG — SIGNIFICANT CHANGE UP (ref 27–34)
MCHC RBC-ENTMCNC: 32.2 G/DL — SIGNIFICANT CHANGE UP (ref 32–36)
MCV RBC AUTO: 86 FL — SIGNIFICANT CHANGE UP (ref 80–100)
PAT CTL 2H: 38.5 SEC — HIGH (ref 27.5–37.4)
PLATELET # BLD AUTO: 61 K/UL — LOW (ref 150–400)
PT 50/50: 13.3 SECS — HIGH (ref 10–13.1)
RBC # BLD: 3.5 M/UL — LOW (ref 3.8–5.2)
RBC # FLD: 18.5 % — HIGH (ref 10.3–16.9)
SPECIMEN SOURCE: SIGNIFICANT CHANGE UP
SPECIMEN SOURCE: SIGNIFICANT CHANGE UP
SURGICAL PATHOLOGY STUDY: SIGNIFICANT CHANGE UP
THROMBIN TIME: 26.9 SEC — HIGH (ref 17.6–24)
WBC # BLD: 5.8 K/UL — SIGNIFICANT CHANGE UP (ref 3.8–10.5)
WBC # FLD AUTO: 5.8 K/UL — SIGNIFICANT CHANGE UP (ref 3.8–10.5)

## 2017-02-21 PROCEDURE — 99233 SBSQ HOSP IP/OBS HIGH 50: CPT

## 2017-02-21 RX ORDER — HEPARIN SODIUM 5000 [USP'U]/ML
1100 INJECTION INTRAVENOUS; SUBCUTANEOUS
Qty: 25000 | Refills: 0 | Status: DISCONTINUED | OUTPATIENT
Start: 2017-02-21 | End: 2017-02-22

## 2017-02-21 RX ORDER — DEXTROSE 50 % IN WATER 50 %
25 SYRINGE (ML) INTRAVENOUS ONCE
Qty: 0 | Refills: 0 | Status: COMPLETED | OUTPATIENT
Start: 2017-02-21 | End: 2017-02-21

## 2017-02-21 RX ADMIN — Medication 250 MILLIGRAM(S): at 11:34

## 2017-02-21 RX ADMIN — Medication 25 GRAM(S): at 11:34

## 2017-02-21 RX ADMIN — HEPARIN SODIUM 10 UNIT(S)/HR: 5000 INJECTION INTRAVENOUS; SUBCUTANEOUS at 13:25

## 2017-02-21 RX ADMIN — Medication 1 TABLET(S): at 11:34

## 2017-02-21 RX ADMIN — Medication 1 APPLICATION(S): at 11:36

## 2017-02-21 RX ADMIN — DIVALPROEX SODIUM 500 MILLIGRAM(S): 500 TABLET, DELAYED RELEASE ORAL at 18:01

## 2017-02-21 RX ADMIN — Medication 1 MILLIGRAM(S): at 11:34

## 2017-02-21 RX ADMIN — BRIMONIDINE TARTRATE 1 DROP(S): 2 SOLUTION/ DROPS OPHTHALMIC at 22:48

## 2017-02-21 RX ADMIN — Medication 100 MILLIGRAM(S): at 11:34

## 2017-02-21 RX ADMIN — Medication 2: at 22:48

## 2017-02-21 RX ADMIN — QUETIAPINE FUMARATE 100 MILLIGRAM(S): 200 TABLET, FILM COATED ORAL at 22:48

## 2017-02-21 RX ADMIN — BRIMONIDINE TARTRATE 1 DROP(S): 2 SOLUTION/ DROPS OPHTHALMIC at 13:24

## 2017-02-21 RX ADMIN — Medication 1 APPLICATION(S): at 22:48

## 2017-02-21 RX ADMIN — DIVALPROEX SODIUM 500 MILLIGRAM(S): 500 TABLET, DELAYED RELEASE ORAL at 06:12

## 2017-02-21 NOTE — PROGRESS NOTE ADULT - SUBJECTIVE AND OBJECTIVE BOX
INTERVAL HPI/OVERNIGHT EVENTS:  Patient was seen and examined at bedside.  RENÉ overnight. Patient aware that she will have bone marrow biopsy today by hematology/oncology.     VITAL SIGNS:  T(F): 97  HR: 58  BP: 168/74  RR: 17  SpO2: 100%  Wt(kg): --    PHYSICAL EXAM:    Constitutional: WDWN, NAD  Eyes: PERRL, EOMI, sclera non-icteric  Neck: supple, trachea midline, no masses, no JVD  Respiratory: CTA b/l, good air entry b/l, no wheezing, rhonchi, rales, without accessory muscle use and no intercostal retractions  Cardiovascular: RRR, normal S1S2, no M/R/G  Gastrointestinal: soft, NTND, no masses palpable, BS normal  Extremities: Left index finger s/p amputation, wrapped in gauze.   Neurological: AAOx3, CN Grossly intact  Skin: Normal temperature, warm, dry    MEDICATIONS  (STANDING):  diVALproex DR 500milliGRAM(s) Oral two times a day  QUEtiapine 100milliGRAM(s) Oral at bedtime  brimonidine 0.2% Ophthalmic Solution 1Drop(s) Both EYES two times a day  insulin lispro (HumaLOG) corrective regimen sliding scale  SubCutaneous Before meals and at bedtime  thiamine 100milliGRAM(s) Oral daily  folic acid 1milliGRAM(s) Oral daily  multivitamin 1Tablet(s) Oral daily  ascorbic acid 250milliGRAM(s) Oral daily  polyethylene glycol 3350 17Gram(s) Oral two times a day  silver sulfADIAZINE 1% Cream 1Application(s) Topical two times a day  heparin  Infusion 1000Unit(s)/Hr IV Continuous <Continuous>    MEDICATIONS  (PRN):  acetaminophen   Tablet. 650milliGRAM(s) Oral every 6 hours PRN Moderate Pain (4 - 6)  morphine  - Injectable 4milliGRAM(s) IV Push every 10 minutes PRN Severe Pain (7 - 10)  ondansetron Injectable 4milliGRAM(s) IV Push once PRN Nausea and/or Vomiting      Allergies    No Known Allergies    Intolerances        LABS:                        9.7    5.8   )-----------( 61       ( 21 Feb 2017 11:02 )             30.1     20 Feb 2017 06:23    148    |  120    |  21     ----------------------------<  69     5.2     |  20     |  1.25     Ca    8.0        20 Feb 2017 06:23  Mg     2.1       20 Feb 2017 06:23      PTT - ( 21 Feb 2017 11:04 )  PTT:75.7 sec      RADIOLOGY & ADDITIONAL TESTS: Hospital Course:72 y/o female (poor historian) with pmhx of bipolar dx, schizophrenia, parkinson's, DM, PVD with suspected Raynaud's s/p right middle finger amputation (10/08/16) presented with left 2nd digit pain x 1 month, admitted for dry gangrene. Patient started on vancomycin/zosyn as she was hypothermic. Patient also with anemia hgb to ~7 and thrombocytopenia which appeared to be chronic (60-90). Patient noted to have hgb 5.7 after admission, given unit PRBC, with subsequently appropriate response. Orthopedics, hand surgery seen patient, and amputated 2/18 left proximal index finger. Antibiotics discontinued patient no longer hypothermic without further signs of infection. Also found to have DVT, started on heparin gtt. Patient seen by Heme-onc for anemia/thrombocytopenia and is planned to have bone marrow biopsy.  Seen by PT, and recommending ZAIRA.    INTERVAL HPI/OVERNIGHT EVENTS:  Patient was seen and examined at bedside.  RENÉ overnight. Patient aware that she will have bone marrow biopsy today by hematology/oncology.     VITAL SIGNS:  T(F): 97  HR: 58  BP: 168/74  RR: 17  SpO2: 100%  Wt(kg): --    PHYSICAL EXAM:    Constitutional: WDWN, NAD  Eyes: PERRL, EOMI, sclera non-icteric  Neck: supple, trachea midline, no masses, no JVD  Respiratory: CTA b/l, good air entry b/l, no wheezing, rhonchi, rales, without accessory muscle use and no intercostal retractions  Cardiovascular: RRR, normal S1S2, no M/R/G  Gastrointestinal: soft, NTND, no masses palpable, BS normal  Extremities: Left index finger s/p amputation, wrapped in gauze.   Neurological: AAOx3, CN Grossly intact  Skin: Normal temperature, warm, dry    MEDICATIONS  (STANDING):  diVALproex DR 500milliGRAM(s) Oral two times a day  QUEtiapine 100milliGRAM(s) Oral at bedtime  brimonidine 0.2% Ophthalmic Solution 1Drop(s) Both EYES two times a day  insulin lispro (HumaLOG) corrective regimen sliding scale  SubCutaneous Before meals and at bedtime  thiamine 100milliGRAM(s) Oral daily  folic acid 1milliGRAM(s) Oral daily  multivitamin 1Tablet(s) Oral daily  ascorbic acid 250milliGRAM(s) Oral daily  polyethylene glycol 3350 17Gram(s) Oral two times a day  silver sulfADIAZINE 1% Cream 1Application(s) Topical two times a day  heparin  Infusion 1000Unit(s)/Hr IV Continuous <Continuous>    MEDICATIONS  (PRN):  acetaminophen   Tablet. 650milliGRAM(s) Oral every 6 hours PRN Moderate Pain (4 - 6)  morphine  - Injectable 4milliGRAM(s) IV Push every 10 minutes PRN Severe Pain (7 - 10)  ondansetron Injectable 4milliGRAM(s) IV Push once PRN Nausea and/or Vomiting      Allergies    No Known Allergies    Intolerances        LABS:                        9.7    5.8   )-----------( 61       ( 21 Feb 2017 11:02 )             30.1     20 Feb 2017 06:23    148    |  120    |  21     ----------------------------<  69     5.2     |  20     |  1.25     Ca    8.0        20 Feb 2017 06:23  Mg     2.1       20 Feb 2017 06:23      PTT - ( 21 Feb 2017 11:04 )  PTT:75.7 sec      RADIOLOGY & ADDITIONAL TESTS:

## 2017-02-21 NOTE — PROGRESS NOTE ADULT - PROBLEM SELECTOR PLAN 6
Unclear etiology at this time; Heme;Onc following for intermittent pancytopenia  -trend CBC, monitor for signs of anemia/bleeding.  -Bone Marrow biopsy today, mixing study today.

## 2017-02-21 NOTE — PROGRESS NOTE ADULT - ASSESSMENT
70 y/o with extensive PVD, psychiatric illness, multiple ulcers, presents with finger pain 2/2 dry gangrene now s/p amputation with pain adequately controlled and on heparin gtt until bone marrow biopsy completed.

## 2017-02-21 NOTE — PROGRESS NOTE ADULT - PROBLEM SELECTOR PLAN 3
DVT in right mid-distal femoral vein.   - Heparin gtt until bone marrow biopsy, then will consider bridging to either noac or coumadin.

## 2017-02-21 NOTE — PROGRESS NOTE ADULT - PROBLEM SELECTOR PLAN 4
> 40 lbs in past 3 months. Likely malnutrition induced as patient cachectic with temporal wasting on exam. Will f/u nutrition recommendations and consider malignancy workup  -Heme-Onc to do bone marrow biopsy today (with intermittent hx thrombocytopenia/pancytopenia )

## 2017-02-21 NOTE — PROGRESS NOTE ADULT - PROBLEM SELECTOR PLAN 5
right heel, left anterior thigh, sacral/ decubiti ulcer  - f/'u wound care rec's  possible source of infection

## 2017-02-22 ENCOUNTER — TRANSCRIPTION ENCOUNTER (OUTPATIENT)
Age: 72
End: 2017-02-22

## 2017-02-22 DIAGNOSIS — H40.9 UNSPECIFIED GLAUCOMA: ICD-10-CM

## 2017-02-22 LAB
APTT BLD: 55.8 SEC — HIGH (ref 27.5–37.4)
APTT BLD: 84.1 SEC — HIGH (ref 27.5–37.4)
HCV AB S/CO SERPL IA: 0.15 S/CO — SIGNIFICANT CHANGE UP
HCV AB SERPL-IMP: SIGNIFICANT CHANGE UP

## 2017-02-22 PROCEDURE — 99233 SBSQ HOSP IP/OBS HIGH 50: CPT

## 2017-02-22 RX ORDER — LIDOCAINE HCL 20 MG/ML
50 VIAL (ML) INJECTION ONCE
Qty: 0 | Refills: 0 | Status: COMPLETED | OUTPATIENT
Start: 2017-02-22 | End: 2017-02-22

## 2017-02-22 RX ORDER — APIXABAN 2.5 MG/1
10 TABLET, FILM COATED ORAL
Qty: 0 | Refills: 0 | Status: DISCONTINUED | OUTPATIENT
Start: 2017-02-22 | End: 2017-02-23

## 2017-02-22 RX ORDER — HEPARIN SODIUM 5000 [USP'U]/ML
1000 INJECTION INTRAVENOUS; SUBCUTANEOUS
Qty: 25000 | Refills: 0 | Status: DISCONTINUED | OUTPATIENT
Start: 2017-02-22 | End: 2017-02-22

## 2017-02-22 RX ADMIN — POLYETHYLENE GLYCOL 3350 17 GRAM(S): 17 POWDER, FOR SOLUTION ORAL at 06:35

## 2017-02-22 RX ADMIN — POLYETHYLENE GLYCOL 3350 17 GRAM(S): 17 POWDER, FOR SOLUTION ORAL at 18:58

## 2017-02-22 RX ADMIN — APIXABAN 10 MILLIGRAM(S): 2.5 TABLET, FILM COATED ORAL at 13:24

## 2017-02-22 RX ADMIN — Medication 250 MILLIGRAM(S): at 13:23

## 2017-02-22 RX ADMIN — QUETIAPINE FUMARATE 100 MILLIGRAM(S): 200 TABLET, FILM COATED ORAL at 21:46

## 2017-02-22 RX ADMIN — APIXABAN 10 MILLIGRAM(S): 2.5 TABLET, FILM COATED ORAL at 22:38

## 2017-02-22 RX ADMIN — Medication 1 APPLICATION(S): at 21:46

## 2017-02-22 RX ADMIN — Medication 1 MILLIGRAM(S): at 13:28

## 2017-02-22 RX ADMIN — Medication 50 MILLILITER(S): at 13:27

## 2017-02-22 RX ADMIN — DIVALPROEX SODIUM 500 MILLIGRAM(S): 500 TABLET, DELAYED RELEASE ORAL at 18:58

## 2017-02-22 RX ADMIN — Medication 1 APPLICATION(S): at 13:25

## 2017-02-22 RX ADMIN — BRIMONIDINE TARTRATE 1 DROP(S): 2 SOLUTION/ DROPS OPHTHALMIC at 21:45

## 2017-02-22 RX ADMIN — Medication 1 TABLET(S): at 13:23

## 2017-02-22 RX ADMIN — DIVALPROEX SODIUM 500 MILLIGRAM(S): 500 TABLET, DELAYED RELEASE ORAL at 06:35

## 2017-02-22 RX ADMIN — BRIMONIDINE TARTRATE 1 DROP(S): 2 SOLUTION/ DROPS OPHTHALMIC at 13:28

## 2017-02-22 RX ADMIN — Medication 2: at 18:57

## 2017-02-22 RX ADMIN — Medication 100 MILLIGRAM(S): at 13:23

## 2017-02-22 NOTE — DISCHARGE NOTE ADULT - MEDICATION SUMMARY - MEDICATIONS TO TAKE
I will START or STAY ON the medications listed below when I get home from the hospital:    apixaban 5 mg oral tablet  -- 2 tab(s) by mouth 2 times a day  -- Indication: For Deep vein thrombosis (DVT)    Depakote 500 mg oral delayed release tablet  -- 1 tab(s) by mouth 2 times a day  -- Indication: For Psychiatric illness    SEROquel 100 mg oral tablet  -- 1 tab(s) by mouth once a day (at bedtime)  -- Indication: For Psychiatric illness    silver sulfADIAZINE 1% topical cream  -- 1 application on skin 2 times a day  -- Indication: For Rash    polyethylene glycol 3350 oral powder for reconstitution  -- 17 gram(s) by mouth 2 times a day  -- Indication: For Constipation    brimonidine 0.2% ophthalmic solution  -- 1 drop(s) to each affected eye 2 times a day  -- Indication: For Glaucoma    pantoprazole 40 mg oral delayed release tablet  -- 1 tab(s) by mouth once a day before breakfast  -- Indication: For Hematemesis    Multiple Vitamins oral tablet  -- 1 tab(s) by mouth once a day  -- Indication: For Nutrition    thiamine 100 mg oral tablet  -- 1 tab(s) by mouth once a day  -- Indication: For Nutrition    folic acid 1 mg oral tablet  -- 1 tab(s) by mouth once a day  -- Indication: For Nutrition    ascorbic acid 250 mg oral tablet  -- 1 tab(s) by mouth once a day  -- Indication: For Nutrition

## 2017-02-22 NOTE — DISCHARGE NOTE ADULT - PLAN OF CARE
You were admitted for sepsis from gangrene of your left 2nd finger, which was amputated by Orthopedic Surgery. Follow up with orthopedic surgeon Dr. Elijah Jarrett. Follow up with hematologist Dr. Lopez on 3/1 or 3/8 to review the results of your bone marrow biopsy. You were diagnosed with a blood clot in your right leg and were started on the blood thinner Eliquis. Take Eliquis 10mg twice a day for a total of 7 days (last day 2/28/17), then take Eliquis 5mg twice a day. Follow up with your primary care physician Dr. Gomez within 1-2 weeks of discharge from rehab for further management of your blood clot. Follow up with orthopedic surgeon Dr. Elijah Jarrett You threw up a small amount of blood during your hospitalization. Your vitals signs were normal and your hemoglobin was stable. Take pantoprazole as prescribed. If you throw up blood again, stop taking Eliquis and go to your nearest ER. Follow up with your primary care physician for further management. Continue your home medications. Continue your home eye drops. Take your vitamins and nutrition supplements as prescribed. Follow up with hematologist Dr. Lopez on 3/1/17 at 10:45AM Take Eliquis as prescibred and follow up with your primary care physician Dr. Gomez You were admitted for sepsis from gangrene of your left 2nd finger, which was amputated by Orthopedic Surgery. Follow up with orthopedic surgeon Dr. Elijah Jarrett within 1-2 weeks of discharge from rehab. You had a bone marrow biopsy performed because of your anemia and low platelet count. Follow up with hematologist Dr. Lopez on 3/1/17 at 10:45AM to review the results of your bone marrow biopsy. It is very important that you follow up with Dr. Lopez for these results. You threw up brown contents with streaks of black. Your vitals signs were normal and your hemoglobin was stable. Take pantoprazole as prescribed. If you throw up blood again, stop taking Eliquis and go to your nearest ER. Follow up with your primary care physician for further management. Take Eliquis as prescribed and follow up with your primary care physician Dr. Gomez You were admitted for sepsis from gangrene of your left 2nd finger, which was amputated by Orthopedic Surgery, after which your sepsis resolved. Follow up with orthopedic surgeon Dr. Elijah Jarrett within 1-2 weeks of discharge from rehab. You threw up brown contents with black streaks on the day of discharge. This was initially concerning for blood, but your vitals signs were normal and your hemoglobin was stable. Take pantoprazole as prescribed. If you throw up again and it looks like red or black blood, stop taking Eliquis and go to your nearest ER. Follow up with your primary care physician for further management.

## 2017-02-22 NOTE — PROGRESS NOTE ADULT - PROBLEM SELECTOR PLAN 7
c/w home Depakote and Seroquel - dysphagia diet as relatively edentulous  - c/w vitamin C, folic acid, thiamine, MVI given concern for malnutrition with Alb 2  - replete lytes prn

## 2017-02-22 NOTE — PROGRESS NOTE ADULT - PROBLEM SELECTOR PLAN 5
right heel, left anterior thigh, sacral/ decubiti ulcer  - f/'u wound care rec's  possible source of infection History of bipolar disorder and schizophrenia.  - c/w home Depakote and Seroquel

## 2017-02-22 NOTE — DISCHARGE NOTE ADULT - CARE PROVIDER_API CALL
Elijah Jarrett), Orthopaedic Surgery  [address] [place] [address]  Phone: [phone]  Fax: [phone] Elijah Jarrett), Orthopaedic Surgery  [address] [place] [address]  Phone: [phone]  Fax: [phone]    Marcelo Gomez  [address] [place] [address]  [address]  Phone: [phone]  Fax: (   )    -    Letha Lopez), Internal Medicine; Medical Oncology  [address] [address]  Phone: [phone]  Fax: [phone]

## 2017-02-22 NOTE — PROGRESS NOTE ADULT - PROBLEM SELECTOR PLAN 8
Heparin Gtt - c/w Miralax BID    FULL CODE  Dispo: PT recommends ZAIRA, anticipate discharge tomorrow

## 2017-02-22 NOTE — DISCHARGE NOTE ADULT - PROVIDER TOKENS
ORI:'[id number]' TOKEN:'[id number]',FREE:[LAST:[Patricia],FIRST:[Marcelo],PHONE:[phone]],FAX:[(   [phone]    -],ADDRESS:[[address] [place] [address]]],TOKEN:'[id number]'

## 2017-02-22 NOTE — DISCHARGE NOTE ADULT - SECONDARY DIAGNOSIS.
Pancytopenia Deep vein thrombosis (DVT) Hematemesis with nausea Schizophrenia Glaucoma Nutrition, metabolism, and development symptoms Gastrointestinal hemorrhage, unspecified gastrointestinal hemorrhage type Bipolar disorder

## 2017-02-22 NOTE — PROGRESS NOTE ADULT - PROBLEM SELECTOR PLAN 3
DVT in right mid-distal femoral vein.   - Heparin gtt until bone marrow biopsy, then will consider bridging to either noac or coumadin. Admitted with dry gangrene of L 2nd finger s/p distal amputation by Ortho on 2/18. Denies pain. History of PVD and ?Raynaud's with amputation of R middle finger in 10/2016.  - will discuss need for ortho f/u with ortho team

## 2017-02-22 NOTE — DISCHARGE NOTE ADULT - HOSPITAL COURSE
Hospital Course:72 y/o female (poor historian) with pmhx of bipolar dx, schizophrenia, parkinson's, DM, PVD with suspected Raynaud's s/p right middle finger amputation (10/08/16) presented with left 2nd digit pain x 1 month, admitted for dry gangrene. Patient started on vancomycin/zosyn as she was hypothermic. Patient also with anemia hgb to ~7 and thrombocytopenia which appeared to be chronic (60-90). Patient noted to have hgb 5.7 after admission, given unit PRBC, with subsequently appropriate response. Orthopedics, hand surgery seen patient, and amputated 2/18 left proximal index finger. Antibiotics discontinued patient no longer hypothermic without further signs of infection. Also found to have RLE DVT, started on heparin gtt and transitioned to Eliquis. Patient seen by Heme-onc for anemia/thrombocytopenia and underwent uncomplicated bone marrow biopsy prior to discharge. On day of discharge, pt had episode of small hematemesis that self-resolved, VS remained WNL, and pt's Hgb was stable. If hematemesis returns, pt instructed to stop Eliquis and go to nearest ER. On 2/23, pt stable for discharge to [facility] to follow up with hematologist Dr. Lopez on 3/1/17 to review BM bx results. 71F (poor historian) with pmhx of bipolar dx, schizophrenia, parkinson's, DM, PVD with suspected Raynaud's s/p right middle finger amputation (10/08/16) presented with left 2nd digit pain x 1 month, admitted for sepsis 2/2 dry gangrene. Patient started on vancomycin/zosyn as she was hypothermic. Patient also with anemia hgb to ~7 and thrombocytopenia which appeared to be chronic (60-90). Patient noted to have hgb 5.7 after admission, given unit PRBC, with subsequently appropriate response. Orthopedics, hand surgery seen patient, and amputated 2/18 left proximal index finger. Antibiotics discontinued patient no longer hypothermic without further signs of infection. Also found to have RLE DVT, started on heparin gtt and transitioned to Eliquis. Patient seen by Heme-onc for anemia/thrombocytopenia and underwent uncomplicated bone marrow biopsy prior to discharge. On day of discharge, pt had episode of small hematemesis that self-resolved, VS remained WNL, and pt's Hgb was stable. If hematemesis returns, pt instructed to stop Eliquis and go to nearest ER. On 2/23, pt stable for discharge to [facility] to follow up with hematologist Dr. Lopez on 3/1/17 to review BM bx results. 71F PMH PVD with suspected Raynaud s/p right middle finger amputation (10/2016), pancytopenia, bipolar disorder, schizophrenia, Parkinson's, DM, presented 2/15/17 for L 2nd finger pain x 1 month, admitted for sepsis 2/2 gangrenous L 2nd finger. Sepsis resolved s/p distal L 2nd finger amputation by Orthopedic Surgery on 2/18/17. On admission, pt was found to be anemic and thrombocytopenic. Given history of pancytopenia and weight loss, pt underwent uncomplicated bone marrow biopsy by Heme-Onc. For anemia (Hgb 5.7), pt was given 1u pRBC with appropriate response. Pt also found to have RLE DVT, for which she was started on hep gtt and bridged to Eliquis. On day of discharge, pt had episode of brown emesis with black steaks, initially concerning for coffee-ground emesis but hematemesis ultimately deemed unlikely as pt's was HD stable, Hgb was stable, and pt had no further episodes of emesis. On 2/23, pt stable for discharge to [facility] to follow up with hematologist Dr. Lopez on 3/1/17 to review BM bx results. Pt instructed to stop Eliquis and go to nearest ER if has emesis with red or black blood after discharge.

## 2017-02-22 NOTE — DISCHARGE NOTE ADULT - CARE PLAN
Principal Discharge DX:	Sepsis  Instructions for follow-up, activity and diet:	You were admitted for sepsis from gangrene of your left 2nd finger, which was amputated by Orthopedic Surgery. Follow up with orthopedic surgeon Dr. Elijah Jarrett.  Secondary Diagnosis:	Pancytopenia  Instructions for follow-up, activity and diet:	Follow up with hematologist Dr. Lopez on 3/1 or 3/8 to review the results of your bone marrow biopsy. Principal Discharge DX:	Sepsis  Goal:	Follow up with orthopedic surgeon Dr. Elijah Jarrett  Instructions for follow-up, activity and diet:	You were admitted for sepsis from gangrene of your left 2nd finger, which was amputated by Orthopedic Surgery. Follow up with orthopedic surgeon Dr. Elijah Jarrett within 1-2 weeks of discharge from rehab.  Secondary Diagnosis:	Pancytopenia  Goal:	Follow up with hematologist Dr. Lopez on 3/1/17 at 10:45AM  Instructions for follow-up, activity and diet:	You had a bone marrow biopsy performed because of your anemia and low platelet count. Follow up with hematologist Dr. Lopez on 3/1/17 at 10:45AM to review the results of your bone marrow biopsy. It is very important that you follow up with Dr. Lopez for these results.  Secondary Diagnosis:	Deep vein thrombosis (DVT)  Goal:	Take Eliquis as prescibred and follow up with your primary care physician Dr. Gomez  Instructions for follow-up, activity and diet:	You were diagnosed with a blood clot in your right leg and were started on the blood thinner Eliquis. Take Eliquis 10mg twice a day for a total of 7 days (last day 2/28/17), then take Eliquis 5mg twice a day. Follow up with your primary care physician Dr. Gomez within 1-2 weeks of discharge from rehab for further management of your blood clot.  Secondary Diagnosis:	Hematemesis with nausea  Instructions for follow-up, activity and diet:	You threw up a small amount of blood during your hospitalization. Your vitals signs were normal and your hemoglobin was stable. Take pantoprazole as prescribed. If you throw up blood again, stop taking Eliquis and go to your nearest ER. Follow up with your primary care physician for further management.  Secondary Diagnosis:	Schizophrenia  Instructions for follow-up, activity and diet:	Continue your home medications.  Secondary Diagnosis:	Glaucoma  Instructions for follow-up, activity and diet:	Continue your home eye drops.  Secondary Diagnosis:	Nutrition, metabolism, and development symptoms  Instructions for follow-up, activity and diet:	Take your vitamins and nutrition supplements as prescribed. Principal Discharge DX:	Sepsis  Goal:	Follow up with orthopedic surgeon Dr. Elijah Jarrett  Instructions for follow-up, activity and diet:	You were admitted for sepsis from gangrene of your left 2nd finger, which was amputated by Orthopedic Surgery. Follow up with orthopedic surgeon Dr. Elijah Jarrett within 1-2 weeks of discharge from rehab.  Secondary Diagnosis:	Pancytopenia  Goal:	Follow up with hematologist Dr. Lopez on 3/1/17 at 10:45AM  Instructions for follow-up, activity and diet:	You had a bone marrow biopsy performed because of your anemia and low platelet count. Follow up with hematologist Dr. Lopez on 3/1/17 at 10:45AM to review the results of your bone marrow biopsy. It is very important that you follow up with Dr. Lopez for these results.  Secondary Diagnosis:	Deep vein thrombosis (DVT)  Goal:	Take Eliquis as prescibred and follow up with your primary care physician Dr. Gomez  Instructions for follow-up, activity and diet:	You were diagnosed with a blood clot in your right leg and were started on the blood thinner Eliquis. Take Eliquis 10mg twice a day for a total of 7 days (last day 2/28/17), then take Eliquis 5mg twice a day. Follow up with your primary care physician Dr. Gomez within 1-2 weeks of discharge from rehab for further management of your blood clot.  Secondary Diagnosis:	Gastrointestinal hemorrhage, unspecified gastrointestinal hemorrhage type  Instructions for follow-up, activity and diet:	You threw up brown contents with streaks of black. Your vitals signs were normal and your hemoglobin was stable. Take pantoprazole as prescribed. If you throw up blood again, stop taking Eliquis and go to your nearest ER. Follow up with your primary care physician for further management.  Secondary Diagnosis:	Bipolar disorder  Instructions for follow-up, activity and diet:	Continue your home medications.  Secondary Diagnosis:	Nutrition, metabolism, and development symptoms  Instructions for follow-up, activity and diet:	Take your vitamins and nutrition supplements as prescribed.  Secondary Diagnosis:	Glaucoma  Instructions for follow-up, activity and diet:	Continue your home eye drops. Principal Discharge DX:	Sepsis  Goal:	Follow up with orthopedic surgeon Dr. Elijah Jarrett  Instructions for follow-up, activity and diet:	You were admitted for sepsis from gangrene of your left 2nd finger, which was amputated by Orthopedic Surgery, after which your sepsis resolved. Follow up with orthopedic surgeon Dr. Elijah Jarrett within 1-2 weeks of discharge from rehab.  Secondary Diagnosis:	Pancytopenia  Goal:	Follow up with hematologist Dr. Lopez on 3/1/17 at 10:45AM  Instructions for follow-up, activity and diet:	You had a bone marrow biopsy performed because of your anemia and low platelet count. Follow up with hematologist Dr. Lopez on 3/1/17 at 10:45AM to review the results of your bone marrow biopsy. It is very important that you follow up with Dr. Lopez for these results.  Secondary Diagnosis:	Deep vein thrombosis (DVT)  Goal:	Take Eliquis as prescribed and follow up with your primary care physician Dr. Gomez  Instructions for follow-up, activity and diet:	You were diagnosed with a blood clot in your right leg and were started on the blood thinner Eliquis. Take Eliquis 10mg twice a day for a total of 7 days (last day 2/28/17), then take Eliquis 5mg twice a day. Follow up with your primary care physician Dr. Gomez within 1-2 weeks of discharge from rehab for further management of your blood clot.  Secondary Diagnosis:	Gastrointestinal hemorrhage, unspecified gastrointestinal hemorrhage type  Instructions for follow-up, activity and diet:	You threw up brown contents with black streaks on the day of discharge. This was initially concerning for blood, but your vitals signs were normal and your hemoglobin was stable. Take pantoprazole as prescribed. If you throw up again and it looks like red or black blood, stop taking Eliquis and go to your nearest ER. Follow up with your primary care physician for further management.  Secondary Diagnosis:	Bipolar disorder  Instructions for follow-up, activity and diet:	Continue your home medications.  Secondary Diagnosis:	Nutrition, metabolism, and development symptoms  Instructions for follow-up, activity and diet:	Take your vitamins and nutrition supplements as prescribed.  Secondary Diagnosis:	Glaucoma  Instructions for follow-up, activity and diet:	Continue your home eye drops.

## 2017-02-22 NOTE — DISCHARGE NOTE ADULT - CARE PROVIDERS DIRECT ADDRESSES
,DirectAddress_Unknown,DirectAddress_Unknown ,DirectAddress_Unknown,DirectAddress_Unknown,[email or web address],DirectAddress_Unknown

## 2017-02-22 NOTE — DISCHARGE NOTE ADULT - ADDITIONAL INSTRUCTIONS
Follow up with hematologist Dr. Lopez on 3/1/17 at 10:45AM to review the results of your bone marrow biopsy.

## 2017-02-22 NOTE — DISCHARGE NOTE ADULT - MEDICATION SUMMARY - MEDICATIONS TO STOP TAKING
I will STOP taking the medications listed below when I get home from the hospital:    rivaroxaban 15 mg oral tablet  -- 1 tab(s) by mouth 2 times a day (with meals)    rivaroxaban 20 mg oral tablet  -- 1 tab(s) by mouth once a day (in the evening)

## 2017-02-22 NOTE — PROGRESS NOTE ADULT - PROBLEM SELECTOR PLAN 1
dry gangrene of left 2nd digit. distal phalange black, necrotic, painful to touch. hx of extensive PVD with previous amputation of right middle finger. s/p amputation, tolerated well. Morphine for pain control. DVT in R mid-distal femoral vein, diagnosed on this admission.  - stop hep gtt, start Eliquis 10mg BID x 7 days, then 5mg BID

## 2017-02-22 NOTE — PROGRESS NOTE ADULT - PROBLEM SELECTOR PLAN 2
Resolved. No elevation of WBC, no signs of sepsis currently.  -continue to monitor. History of intermittent pancytopenia ~2 years. Currently with anemia and thrombocytopenia, stable. Concerning for possible malignancy given >40-lb weight loss in past 3 months. S/P BM biopsy by Heme-Onc today, uncomplicated.  - outpt f/u to review BM bx results with Dr. Lopez on 3/1 or 3/8 (will call to schedule appt)

## 2017-02-22 NOTE — PROGRESS NOTE ADULT - PROBLEM SELECTOR PLAN 4
> 40 lbs in past 3 months. Likely malnutrition induced as patient cachectic with temporal wasting on exam. Will f/u nutrition recommendations and consider malignancy workup  -Heme-Onc to do bone marrow biopsy today (with intermittent hx thrombocytopenia/pancytopenia ) Wound care consulted, recs appreciated  - silvadene cream to R thigh BID  - foam dressing to R heel, L hip, and R lower calf every 3 days  - L anterior thigh: clean with saline and apply silvadene cream BID  - b/l buttocks, sacrum, and labia: clean with soap and water BID PRN

## 2017-02-22 NOTE — PROGRESS NOTE ADULT - ASSESSMENT
71F PVD, R middle finger amputation (10/2016), ?Raynaud, DM, HCV, intermittent pancytopenia, bipolar disorder, schizophrenia, Parkinson's disease, admitted 2/15 for dry gangrene of L 2nd finger s/p DIP amputation by Ortho on 2/18, found to have RLE DVT, s/p BM bx today for chronic intermittent pancytopenia, awaiting discharge to [facility] tomorrow.

## 2017-02-22 NOTE — PROGRESS NOTE ADULT - PROBLEM SELECTOR PLAN 6
Unclear etiology at this time; Heme;Onc following for intermittent pancytopenia  -trend CBC, monitor for signs of anemia/bleeding.  -Bone Marrow biopsy today, mixing study today. - c/w home Alphagan drops to both eyes BID

## 2017-02-23 VITALS
HEART RATE: 91 BPM | OXYGEN SATURATION: 95 % | TEMPERATURE: 98 F | SYSTOLIC BLOOD PRESSURE: 117 MMHG | DIASTOLIC BLOOD PRESSURE: 81 MMHG | RESPIRATION RATE: 16 BRPM

## 2017-02-23 DIAGNOSIS — K92.2 GASTROINTESTINAL HEMORRHAGE, UNSPECIFIED: ICD-10-CM

## 2017-02-23 DIAGNOSIS — A41.9 SEPSIS, UNSPECIFIED ORGANISM: ICD-10-CM

## 2017-02-23 LAB
ANION GAP SERPL CALC-SCNC: 9 MMOL/L — SIGNIFICANT CHANGE UP (ref 9–16)
APTT BLD: 32 SEC — SIGNIFICANT CHANGE UP (ref 27.5–37.4)
BASOPHILS NFR BLD AUTO: 0.2 % — SIGNIFICANT CHANGE UP (ref 0–2)
BLD GP AB SCN SERPL QL: NEGATIVE — SIGNIFICANT CHANGE UP
BUN SERPL-MCNC: 29 MG/DL — HIGH (ref 7–23)
CALCIUM SERPL-MCNC: 8.9 MG/DL — SIGNIFICANT CHANGE UP (ref 8.5–10.5)
CHLORIDE SERPL-SCNC: 120 MMOL/L — HIGH (ref 96–108)
CO2 SERPL-SCNC: 24 MMOL/L — SIGNIFICANT CHANGE UP (ref 22–31)
CREAT SERPL-MCNC: 0.96 MG/DL — SIGNIFICANT CHANGE UP (ref 0.5–1.3)
EOSINOPHIL NFR BLD AUTO: 0.1 % — SIGNIFICANT CHANGE UP (ref 0–6)
GLUCOSE SERPL-MCNC: 73 MG/DL — SIGNIFICANT CHANGE UP (ref 70–99)
HCT VFR BLD CALC: 33.5 % — LOW (ref 34.5–45)
HGB BLD-MCNC: 10.7 G/DL — LOW (ref 11.5–15.5)
INR BLD: 1.35 — HIGH (ref 0.88–1.16)
LYMPHOCYTES # BLD AUTO: 14.7 % — SIGNIFICANT CHANGE UP (ref 13–44)
MCHC RBC-ENTMCNC: 27.8 PG — SIGNIFICANT CHANGE UP (ref 27–34)
MCHC RBC-ENTMCNC: 31.9 G/DL — LOW (ref 32–36)
MCV RBC AUTO: 87 FL — SIGNIFICANT CHANGE UP (ref 80–100)
MONOCYTES NFR BLD AUTO: 5.5 % — SIGNIFICANT CHANGE UP (ref 2–14)
NEUTROPHILS NFR BLD AUTO: 79.5 % — HIGH (ref 43–77)
PLATELET # BLD AUTO: 81 K/UL — LOW (ref 150–400)
POTASSIUM SERPL-MCNC: 4.3 MMOL/L — SIGNIFICANT CHANGE UP (ref 3.5–5.3)
POTASSIUM SERPL-SCNC: 4.3 MMOL/L — SIGNIFICANT CHANGE UP (ref 3.5–5.3)
PROTHROM AB SERPL-ACNC: 15 SEC — HIGH (ref 10–13.1)
RBC # BLD: 3.85 M/UL — SIGNIFICANT CHANGE UP (ref 3.8–5.2)
RBC # FLD: 19.2 % — HIGH (ref 10.3–16.9)
RH IG SCN BLD-IMP: POSITIVE — SIGNIFICANT CHANGE UP
SODIUM SERPL-SCNC: 153 MMOL/L — HIGH (ref 135–145)
WBC # BLD: 13.2 K/UL — HIGH (ref 3.8–10.5)
WBC # FLD AUTO: 13.2 K/UL — HIGH (ref 3.8–10.5)

## 2017-02-23 PROCEDURE — 73130 X-RAY EXAM OF HAND: CPT

## 2017-02-23 PROCEDURE — 82533 TOTAL CORTISOL: CPT

## 2017-02-23 PROCEDURE — 85610 PROTHROMBIN TIME: CPT

## 2017-02-23 PROCEDURE — 81001 URINALYSIS AUTO W/SCOPE: CPT

## 2017-02-23 PROCEDURE — 83036 HEMOGLOBIN GLYCOSYLATED A1C: CPT

## 2017-02-23 PROCEDURE — P9016: CPT

## 2017-02-23 PROCEDURE — 86923 COMPATIBILITY TEST ELECTRIC: CPT

## 2017-02-23 PROCEDURE — 86901 BLOOD TYPING SEROLOGIC RH(D): CPT

## 2017-02-23 PROCEDURE — 93005 ELECTROCARDIOGRAM TRACING: CPT

## 2017-02-23 PROCEDURE — 85027 COMPLETE CBC AUTOMATED: CPT

## 2017-02-23 PROCEDURE — 84133 ASSAY OF URINE POTASSIUM: CPT

## 2017-02-23 PROCEDURE — 83605 ASSAY OF LACTIC ACID: CPT

## 2017-02-23 PROCEDURE — 85652 RBC SED RATE AUTOMATED: CPT

## 2017-02-23 PROCEDURE — 86803 HEPATITIS C AB TEST: CPT

## 2017-02-23 PROCEDURE — 88311 DECALCIFY TISSUE: CPT

## 2017-02-23 PROCEDURE — 96375 TX/PRO/DX INJ NEW DRUG ADDON: CPT

## 2017-02-23 PROCEDURE — 97161 PT EVAL LOW COMPLEX 20 MIN: CPT

## 2017-02-23 PROCEDURE — 82436 ASSAY OF URINE CHLORIDE: CPT

## 2017-02-23 PROCEDURE — 88313 SPECIAL STAINS GROUP 2: CPT

## 2017-02-23 PROCEDURE — 81003 URINALYSIS AUTO W/O SCOPE: CPT

## 2017-02-23 PROCEDURE — 99239 HOSP IP/OBS DSCHRG MGMT >30: CPT

## 2017-02-23 PROCEDURE — 82607 VITAMIN B-12: CPT

## 2017-02-23 PROCEDURE — 85732 THROMBOPLASTIN TIME PARTIAL: CPT

## 2017-02-23 PROCEDURE — 85025 COMPLETE CBC W/AUTO DIFF WBC: CPT

## 2017-02-23 PROCEDURE — 86900 BLOOD TYPING SEROLOGIC ABO: CPT

## 2017-02-23 PROCEDURE — 85598 HEXAGNAL PHOSPH PLTLT NEUTRL: CPT

## 2017-02-23 PROCEDURE — 85384 FIBRINOGEN ACTIVITY: CPT

## 2017-02-23 PROCEDURE — 82570 ASSAY OF URINE CREATININE: CPT

## 2017-02-23 PROCEDURE — 36415 COLL VENOUS BLD VENIPUNCTURE: CPT

## 2017-02-23 PROCEDURE — 80202 ASSAY OF VANCOMYCIN: CPT

## 2017-02-23 PROCEDURE — 93970 EXTREMITY STUDY: CPT

## 2017-02-23 PROCEDURE — 82803 BLOOD GASES ANY COMBINATION: CPT

## 2017-02-23 PROCEDURE — 85097 BONE MARROW INTERPRETATION: CPT

## 2017-02-23 PROCEDURE — 83615 LACTATE (LD) (LDH) ENZYME: CPT

## 2017-02-23 PROCEDURE — 71045 X-RAY EXAM CHEST 1 VIEW: CPT

## 2017-02-23 PROCEDURE — 84300 ASSAY OF URINE SODIUM: CPT

## 2017-02-23 PROCEDURE — 83010 ASSAY OF HAPTOGLOBIN QUANT: CPT

## 2017-02-23 PROCEDURE — 87186 SC STD MICRODIL/AGAR DIL: CPT

## 2017-02-23 PROCEDURE — 97116 GAIT TRAINING THERAPY: CPT

## 2017-02-23 PROCEDURE — 82728 ASSAY OF FERRITIN: CPT

## 2017-02-23 PROCEDURE — 86146 BETA-2 GLYCOPROTEIN ANTIBODY: CPT

## 2017-02-23 PROCEDURE — 88341 IMHCHEM/IMCYTCHM EA ADD ANTB: CPT

## 2017-02-23 PROCEDURE — 83935 ASSAY OF URINE OSMOLALITY: CPT

## 2017-02-23 PROCEDURE — 84466 ASSAY OF TRANSFERRIN: CPT

## 2017-02-23 PROCEDURE — 74177 CT ABD & PELVIS W/CONTRAST: CPT

## 2017-02-23 PROCEDURE — 80053 COMPREHEN METABOLIC PANEL: CPT

## 2017-02-23 PROCEDURE — 99285 EMERGENCY DEPT VISIT HI MDM: CPT | Mod: 25

## 2017-02-23 PROCEDURE — 96374 THER/PROPH/DIAG INJ IV PUSH: CPT

## 2017-02-23 PROCEDURE — 87086 URINE CULTURE/COLONY COUNT: CPT

## 2017-02-23 PROCEDURE — 83550 IRON BINDING TEST: CPT

## 2017-02-23 PROCEDURE — 85611 PROTHROMBIN TEST: CPT

## 2017-02-23 PROCEDURE — 80048 BASIC METABOLIC PNL TOTAL CA: CPT

## 2017-02-23 PROCEDURE — 88305 TISSUE EXAM BY PATHOLOGIST: CPT

## 2017-02-23 PROCEDURE — 86140 C-REACTIVE PROTEIN: CPT

## 2017-02-23 PROCEDURE — 82746 ASSAY OF FOLIC ACID SERUM: CPT

## 2017-02-23 PROCEDURE — 87040 BLOOD CULTURE FOR BACTERIA: CPT

## 2017-02-23 PROCEDURE — 83735 ASSAY OF MAGNESIUM: CPT

## 2017-02-23 PROCEDURE — 86850 RBC ANTIBODY SCREEN: CPT

## 2017-02-23 PROCEDURE — 36430 TRANSFUSION BLD/BLD COMPNT: CPT

## 2017-02-23 PROCEDURE — 74018 RADEX ABDOMEN 1 VIEW: CPT

## 2017-02-23 PROCEDURE — 84100 ASSAY OF PHOSPHORUS: CPT

## 2017-02-23 PROCEDURE — 85730 THROMBOPLASTIN TIME PARTIAL: CPT

## 2017-02-23 PROCEDURE — 84443 ASSAY THYROID STIM HORMONE: CPT

## 2017-02-23 PROCEDURE — 85670 THROMBIN TIME PLASMA: CPT

## 2017-02-23 RX ORDER — PANTOPRAZOLE SODIUM 20 MG/1
40 TABLET, DELAYED RELEASE ORAL EVERY 12 HOURS
Qty: 0 | Refills: 0 | Status: DISCONTINUED | OUTPATIENT
Start: 2017-02-23 | End: 2017-02-23

## 2017-02-23 RX ORDER — APIXABAN 2.5 MG/1
10 TABLET, FILM COATED ORAL
Qty: 0 | Refills: 0 | Status: DISCONTINUED | OUTPATIENT
Start: 2017-02-23 | End: 2017-02-23

## 2017-02-23 RX ORDER — FOLIC ACID 0.8 MG
1 TABLET ORAL
Qty: 0 | Refills: 0 | COMMUNITY
Start: 2017-02-23

## 2017-02-23 RX ORDER — DEXTROSE 50 % IN WATER 50 %
25 SYRINGE (ML) INTRAVENOUS ONCE
Qty: 0 | Refills: 0 | Status: COMPLETED | OUTPATIENT
Start: 2017-02-23 | End: 2017-02-23

## 2017-02-23 RX ORDER — ASCORBIC ACID 60 MG
1 TABLET,CHEWABLE ORAL
Qty: 0 | Refills: 0 | COMMUNITY
Start: 2017-02-23

## 2017-02-23 RX ORDER — POLYETHYLENE GLYCOL 3350 17 G/17G
17 POWDER, FOR SOLUTION ORAL
Qty: 0 | Refills: 0 | COMMUNITY
Start: 2017-02-23

## 2017-02-23 RX ORDER — THIAMINE MONONITRATE (VIT B1) 100 MG
1 TABLET ORAL
Qty: 0 | Refills: 0 | COMMUNITY
Start: 2017-02-23

## 2017-02-23 RX ORDER — APIXABAN 2.5 MG/1
2 TABLET, FILM COATED ORAL
Qty: 0 | Refills: 0 | COMMUNITY
Start: 2017-02-23

## 2017-02-23 RX ADMIN — Medication 100 MILLIGRAM(S): at 12:16

## 2017-02-23 RX ADMIN — DIVALPROEX SODIUM 500 MILLIGRAM(S): 500 TABLET, DELAYED RELEASE ORAL at 17:00

## 2017-02-23 RX ADMIN — BRIMONIDINE TARTRATE 1 DROP(S): 2 SOLUTION/ DROPS OPHTHALMIC at 13:39

## 2017-02-23 RX ADMIN — PANTOPRAZOLE SODIUM 40 MILLIGRAM(S): 20 TABLET, DELAYED RELEASE ORAL at 09:35

## 2017-02-23 RX ADMIN — Medication 1 MILLIGRAM(S): at 12:16

## 2017-02-23 RX ADMIN — PANTOPRAZOLE SODIUM 40 MILLIGRAM(S): 20 TABLET, DELAYED RELEASE ORAL at 17:00

## 2017-02-23 RX ADMIN — DIVALPROEX SODIUM 500 MILLIGRAM(S): 500 TABLET, DELAYED RELEASE ORAL at 06:33

## 2017-02-23 RX ADMIN — POLYETHYLENE GLYCOL 3350 17 GRAM(S): 17 POWDER, FOR SOLUTION ORAL at 06:33

## 2017-02-23 RX ADMIN — Medication 25 GRAM(S): at 12:15

## 2017-02-23 RX ADMIN — Medication 1 APPLICATION(S): at 10:26

## 2017-02-23 RX ADMIN — APIXABAN 10 MILLIGRAM(S): 2.5 TABLET, FILM COATED ORAL at 17:00

## 2017-02-23 RX ADMIN — APIXABAN 10 MILLIGRAM(S): 2.5 TABLET, FILM COATED ORAL at 07:20

## 2017-02-23 RX ADMIN — Medication 250 MILLIGRAM(S): at 12:16

## 2017-02-23 RX ADMIN — Medication 1 TABLET(S): at 12:16

## 2017-02-23 NOTE — PROGRESS NOTE ADULT - PROVIDER SPECIALTY LIST ADULT
Heme/Onc
Hospitalist
Internal Medicine
Orthopedics
Internal Medicine
Orthopedics

## 2017-02-23 NOTE — PROGRESS NOTE ADULT - ASSESSMENT
72yo F, PMH of PVD, R middle finger amputation,Raynaud, DM, HCV, intermittent pancytopenia, bipolar disorder, schizophrenia, Parkinson's disease. Admitted for sepsis 2/2 dry gangrene of L 2nd finger s/p DIP amputation by Ortho, found to have an acute RLE DVT.

## 2017-02-23 NOTE — PROGRESS NOTE ADULT - PROBLEM SELECTOR PLAN 8
* dysphagia diet as relatively edentulous  *  c/w vitamin C, folic acid, thiamine, MVI given concern for malnutrition with Alb 2

## 2017-02-23 NOTE — PROGRESS NOTE ADULT - ATTENDING COMMENTS
DC to [facility] today.
Patient was seen and examined by me at bedside. I agree with resident's note, subjective, objective physical exam, assessment and plan with following modifications/additions.     1) Sepsis 2/2 finger gangrene, now improved after amputation.  2) DVT, acute, on heparin drip for now.  3) Anemia, chronic disease and thrombocytopenia, Concern for MDS, BM disorder?  * For BM biopsy today, after which may switch AC to NOACs.  * Heme/Onc following.  4) Depression, on depakote and seroquel.    Rest as above.  Start DC plan.
1) Sepsis 2/2 finger gangrene, now resolved after amputation.  2) DVT, acute, on heparin drip for now. Change to Eliquis,  3) Anemia, chronic disease and thrombocytopenia, Concern for MDS, s/p BMB today. Needs outpatient follow up.  * Heme/Onc following.  4) Depression, on depakote and seroquel.    Rest as above. DC plan to [facility]

## 2017-02-23 NOTE — CHART NOTE - NSCHARTNOTEFT_GEN_A_CORE
At 10am on 2/23/17, RN notified me that pt had vomited after breakfast.    S: Pt reported vomiting after eating half a banana. Denied nausea, melena, BRBPR, history of hematemesis or endoscopy, lightheadedness, dizziness. Emesis was examined and looked like brown contents with black streaks.    PE:  VS: HR 80, /67, RR 18, Sat 95% on RA  HEENT: clear oral mucosa  Abd: soft, nontender, distended, normoactive BS, VINEET with light brown stool    Labs:  Hgb 10.7 (baseline 9s)  BUN 29  INR 1.35    A/P: Unlikely hematemesis given stable VS and Hgb, mildly elevated BUN, and light brown stool. Pt to be discharged on Protonix 40mg QD for possible gastritis, with plan to continue Eliquis for treatment of RLE DVT, with instruction to follow up with PMD upon discharge and stop Eliquis and go to nearest ER if throws up red or black contents.

## 2017-02-23 NOTE — PROGRESS NOTE ADULT - SUBJECTIVE AND OBJECTIVE BOX
Patient ate breakfast and had an episode of vomiting.  Vomiting looked like brown contents with streaks of black. Concerned for coffee grounds.  Patient feeling good.  No other events.  Rest of ROS negative.    Vital Signs Last 24 Hrs  T(C): 35.7, Max: 37.1 (02-22 @ 20:55)  T(F): 96.2, Max: 98.7 (02-22 @ 20:55)  HR: 80 (68 - 92)  BP: 140/67 (119/62 - 140/69)  BP(mean): --  RR: 18 (16 - 18)  SpO2: 95% (95% - 97%)    PHYSICAL EXAMINATION  * General: Not in acute distress. Awake and alert. cachectic.  * Head: Normocephalic, atraumatic. 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  * HEENT: ears no discharge, eyes PERRLA, nose no discharge, throat no exudates, normal tonsils.  * Neck: no JVD, supple.  * Lungs: Clear to auscultation, no rales, no wheezes.  * Cardio: Regular rate and rhythm, no murmurs, no rubs, no gallops. Good peripheral pulses.  * Abdomen: Soft, non-tender, non-distended, tympanic to percussion, no rebound, no guarding, no rigidity. Bowel sounds present. No suprapubic or CVA tenderness.  * : Deferred.  * Extremities: Acyanotic, no edema.  * Skin: Warm and dry.  * Neuro: Alert and oriented x 3. No focal deficits. Motor strength is 5/5 throughout. Sensation intact. Cranial nerves II-XII grossly intact.                           10.7   13.2  )-----------( 81       ( 23 Feb 2017 11:44 )             33.5   23 Feb 2017 11:44    153    |  120    |  29     ----------------------------<  73     4.3     |  24     |  0.96     Ca    8.9        23 Feb 2017 11:44    MEDICATIONS  (STANDING):  diVALproex DR 500milliGRAM(s) Oral two times a day  QUEtiapine 100milliGRAM(s) Oral at bedtime  brimonidine 0.2% Ophthalmic Solution 1Drop(s) Both EYES two times a day  insulin lispro (HumaLOG) corrective regimen sliding scale  SubCutaneous Before meals and at bedtime  thiamine 100milliGRAM(s) Oral daily  folic acid 1milliGRAM(s) Oral daily  multivitamin 1Tablet(s) Oral daily  ascorbic acid 250milliGRAM(s) Oral daily  polyethylene glycol 3350 17Gram(s) Oral two times a day  silver sulfADIAZINE 1% Cream 1Application(s) Topical two times a day  pantoprazole  Injectable 40milliGRAM(s) IV Push every 12 hours  apixaban 10milliGRAM(s) Oral two times a day    MEDICATIONS  (PRN):  acetaminophen   Tablet. 650milliGRAM(s) Oral every 6 hours PRN Moderate Pain (4 - 6)

## 2017-02-23 NOTE — PROGRESS NOTE ADULT - PROBLEM SELECTOR PLAN 4
Intermittent pancytopenia, chornic. S/P BM biopsy.  * Outpatient f/u to review BM bx results with Dr. Lopez on 3/1 or 3/8

## 2017-02-27 LAB — HEMATOPATHOLOGY REPORT: SIGNIFICANT CHANGE UP

## 2017-02-28 DIAGNOSIS — G20 PARKINSON'S DISEASE: ICD-10-CM

## 2017-02-28 DIAGNOSIS — Z79.4 LONG TERM (CURRENT) USE OF INSULIN: ICD-10-CM

## 2017-02-28 DIAGNOSIS — D64.9 ANEMIA, UNSPECIFIED: ICD-10-CM

## 2017-02-28 DIAGNOSIS — I96 GANGRENE, NOT ELSEWHERE CLASSIFIED: ICD-10-CM

## 2017-02-28 DIAGNOSIS — E87.2 ACIDOSIS: ICD-10-CM

## 2017-02-28 DIAGNOSIS — F99 MENTAL DISORDER, NOT OTHERWISE SPECIFIED: ICD-10-CM

## 2017-02-28 DIAGNOSIS — A41.9 SEPSIS, UNSPECIFIED ORGANISM: ICD-10-CM

## 2017-02-28 DIAGNOSIS — E11.9 TYPE 2 DIABETES MELLITUS WITHOUT COMPLICATIONS: ICD-10-CM

## 2017-02-28 DIAGNOSIS — M79.645 PAIN IN LEFT FINGER(S): ICD-10-CM

## 2017-02-28 DIAGNOSIS — L89.153 PRESSURE ULCER OF SACRAL REGION, STAGE 3: ICD-10-CM

## 2017-02-28 DIAGNOSIS — F20.9 SCHIZOPHRENIA, UNSPECIFIED: ICD-10-CM

## 2017-02-28 DIAGNOSIS — E87.0 HYPEROSMOLALITY AND HYPERNATREMIA: ICD-10-CM

## 2017-02-28 DIAGNOSIS — F31.9 BIPOLAR DISORDER, UNSPECIFIED: ICD-10-CM

## 2017-02-28 DIAGNOSIS — D69.6 THROMBOCYTOPENIA, UNSPECIFIED: ICD-10-CM

## 2017-02-28 DIAGNOSIS — I82.409 ACUTE EMBOLISM AND THROMBOSIS OF UNSPECIFIED DEEP VEINS OF UNSPECIFIED LOWER EXTREMITY: ICD-10-CM

## 2017-02-28 DIAGNOSIS — E86.0 DEHYDRATION: ICD-10-CM

## 2017-02-28 DIAGNOSIS — H40.9 UNSPECIFIED GLAUCOMA: ICD-10-CM

## 2017-02-28 DIAGNOSIS — E43 UNSPECIFIED SEVERE PROTEIN-CALORIE MALNUTRITION: ICD-10-CM

## 2017-02-28 DIAGNOSIS — E11.52 TYPE 2 DIABETES MELLITUS WITH DIABETIC PERIPHERAL ANGIOPATHY WITH GANGRENE: ICD-10-CM

## 2017-02-28 DIAGNOSIS — T68.XXXA HYPOTHERMIA, INITIAL ENCOUNTER: ICD-10-CM

## 2017-02-28 DIAGNOSIS — K92.2 GASTROINTESTINAL HEMORRHAGE, UNSPECIFIED: ICD-10-CM

## 2017-02-28 DIAGNOSIS — R63.4 ABNORMAL WEIGHT LOSS: ICD-10-CM

## 2017-02-28 DIAGNOSIS — D68.9 COAGULATION DEFECT, UNSPECIFIED: ICD-10-CM

## 2017-02-28 DIAGNOSIS — D61.818 OTHER PANCYTOPENIA: ICD-10-CM

## 2017-02-28 DIAGNOSIS — I73.9 PERIPHERAL VASCULAR DISEASE, UNSPECIFIED: ICD-10-CM

## 2017-04-25 ENCOUNTER — INPATIENT (INPATIENT)
Facility: HOSPITAL | Age: 72
LOS: 1 days | Discharge: NURSING, REHAB OR LONG-TERM CARE | DRG: 871 | End: 2017-04-27
Attending: STUDENT IN AN ORGANIZED HEALTH CARE EDUCATION/TRAINING PROGRAM | Admitting: STUDENT IN AN ORGANIZED HEALTH CARE EDUCATION/TRAINING PROGRAM
Payer: MEDICARE

## 2017-04-25 VITALS — SYSTOLIC BLOOD PRESSURE: 107 MMHG | TEMPERATURE: 94 F | DIASTOLIC BLOOD PRESSURE: 59 MMHG

## 2017-04-25 DIAGNOSIS — Z98.89 OTHER SPECIFIED POSTPROCEDURAL STATES: Chronic | ICD-10-CM

## 2017-04-25 DIAGNOSIS — R41.82 ALTERED MENTAL STATUS, UNSPECIFIED: ICD-10-CM

## 2017-04-25 DIAGNOSIS — D69.6 THROMBOCYTOPENIA, UNSPECIFIED: ICD-10-CM

## 2017-04-25 DIAGNOSIS — I73.00 RAYNAUD'S SYNDROME WITHOUT GANGRENE: ICD-10-CM

## 2017-04-25 DIAGNOSIS — N39.0 URINARY TRACT INFECTION, SITE NOT SPECIFIED: ICD-10-CM

## 2017-04-25 DIAGNOSIS — E87.0 HYPEROSMOLALITY AND HYPERNATREMIA: ICD-10-CM

## 2017-04-25 DIAGNOSIS — Z29.9 ENCOUNTER FOR PROPHYLACTIC MEASURES, UNSPECIFIED: ICD-10-CM

## 2017-04-25 DIAGNOSIS — E11.9 TYPE 2 DIABETES MELLITUS WITHOUT COMPLICATIONS: ICD-10-CM

## 2017-04-25 DIAGNOSIS — R63.8 OTHER SYMPTOMS AND SIGNS CONCERNING FOOD AND FLUID INTAKE: ICD-10-CM

## 2017-04-25 DIAGNOSIS — Z09 ENCOUNTER FOR FOLLOW-UP EXAMINATION AFTER COMPLETED TREATMENT FOR CONDITIONS OTHER THAN MALIGNANT NEOPLASM: Chronic | ICD-10-CM

## 2017-04-25 DIAGNOSIS — T68.XXXA HYPOTHERMIA, INITIAL ENCOUNTER: ICD-10-CM

## 2017-04-25 DIAGNOSIS — K56.7 ILEUS, UNSPECIFIED: ICD-10-CM

## 2017-04-25 LAB
ALBUMIN SERPL ELPH-MCNC: 2.2 G/DL — LOW (ref 3.4–5)
ALP SERPL-CCNC: 86 U/L — SIGNIFICANT CHANGE UP (ref 40–120)
ALT FLD-CCNC: 20 U/L — SIGNIFICANT CHANGE UP (ref 12–42)
ANION GAP SERPL CALC-SCNC: 10 MMOL/L — SIGNIFICANT CHANGE UP (ref 9–16)
ANION GAP SERPL CALC-SCNC: 12 MMOL/L — SIGNIFICANT CHANGE UP (ref 9–16)
ANION GAP SERPL CALC-SCNC: 13 MMOL/L — SIGNIFICANT CHANGE UP (ref 9–16)
APPEARANCE UR: SIGNIFICANT CHANGE UP
APTT BLD: 38.4 SEC — HIGH (ref 27.5–37.4)
AST SERPL-CCNC: 95 U/L — HIGH (ref 15–37)
BACTERIA # UR AUTO: (no result) /HPF
BASOPHILS NFR BLD AUTO: 0.1 % — SIGNIFICANT CHANGE UP (ref 0–2)
BILIRUB SERPL-MCNC: 0.3 MG/DL — SIGNIFICANT CHANGE UP (ref 0.2–1.2)
BILIRUB UR-MCNC: NEGATIVE — SIGNIFICANT CHANGE UP
BUN SERPL-MCNC: 42 MG/DL — HIGH (ref 7–23)
BUN SERPL-MCNC: 44 MG/DL — HIGH (ref 7–23)
BUN SERPL-MCNC: 45 MG/DL — HIGH (ref 7–23)
CALCIUM SERPL-MCNC: 6.9 MG/DL — LOW (ref 8.5–10.5)
CALCIUM SERPL-MCNC: 6.9 MG/DL — LOW (ref 8.5–10.5)
CALCIUM SERPL-MCNC: 7.1 MG/DL — LOW (ref 8.5–10.5)
CHLORIDE SERPL-SCNC: 127 MMOL/L — HIGH (ref 96–108)
CHLORIDE SERPL-SCNC: 127 MMOL/L — HIGH (ref 96–108)
CHLORIDE SERPL-SCNC: 128 MMOL/L — HIGH (ref 96–108)
CK MB BLD-MCNC: 3.16 % — SIGNIFICANT CHANGE UP
CK MB CFR SERPL CALC: 57.6 NG/ML — HIGH (ref 0.5–3.6)
CK SERPL-CCNC: 1824 U/L — HIGH (ref 26–192)
CO2 SERPL-SCNC: 13 MMOL/L — LOW (ref 22–31)
CO2 SERPL-SCNC: 14 MMOL/L — LOW (ref 22–31)
CO2 SERPL-SCNC: 17 MMOL/L — LOW (ref 22–31)
COLOR SPEC: YELLOW — SIGNIFICANT CHANGE UP
COMMENT - URINE: SIGNIFICANT CHANGE UP
CORTIS AM PEAK SERPL-MCNC: 23.1 UG/DL — SIGNIFICANT CHANGE UP (ref 3.9–37.5)
CREAT SERPL-MCNC: 1.15 MG/DL — SIGNIFICANT CHANGE UP (ref 0.5–1.3)
CREAT SERPL-MCNC: 1.19 MG/DL — SIGNIFICANT CHANGE UP (ref 0.5–1.3)
CREAT SERPL-MCNC: 1.21 MG/DL — SIGNIFICANT CHANGE UP (ref 0.5–1.3)
DIFF PNL FLD: (no result)
EOSINOPHIL NFR BLD AUTO: 0.1 % — SIGNIFICANT CHANGE UP (ref 0–6)
EPI CELLS # UR: SIGNIFICANT CHANGE UP /HPF
GLUCOSE SERPL-MCNC: 104 MG/DL — HIGH (ref 70–99)
GLUCOSE SERPL-MCNC: 56 MG/DL — LOW (ref 70–99)
GLUCOSE SERPL-MCNC: 59 MG/DL — LOW (ref 70–99)
GLUCOSE UR QL: NEGATIVE — SIGNIFICANT CHANGE UP
HCT VFR BLD CALC: 26 % — LOW (ref 34.5–45)
HGB BLD-MCNC: 8.9 G/DL — LOW (ref 11.5–15.5)
INR BLD: 2.54 — HIGH (ref 0.88–1.16)
KETONES UR-MCNC: NEGATIVE — SIGNIFICANT CHANGE UP
LACTATE SERPL-SCNC: 2.2 MMOL/L — HIGH (ref 0.5–2)
LACTATE SERPL-SCNC: 3.4 MMOL/L — HIGH (ref 0.5–2)
LEUKOCYTE ESTERASE UR-ACNC: (no result)
LYMPHOCYTES # BLD AUTO: 15.7 % — SIGNIFICANT CHANGE UP (ref 13–44)
MCHC RBC-ENTMCNC: 29.8 PG — SIGNIFICANT CHANGE UP (ref 27–34)
MCHC RBC-ENTMCNC: 34.2 G/DL — SIGNIFICANT CHANGE UP (ref 32–36)
MCV RBC AUTO: 87 FL — SIGNIFICANT CHANGE UP (ref 80–100)
MONOCYTES NFR BLD AUTO: 6.3 % — SIGNIFICANT CHANGE UP (ref 2–14)
NEUTROPHILS NFR BLD AUTO: 77.8 % — HIGH (ref 43–77)
NITRITE UR-MCNC: NEGATIVE — SIGNIFICANT CHANGE UP
PH UR: 5.5 — SIGNIFICANT CHANGE UP (ref 5–8)
PLATELET # BLD AUTO: 28 K/UL — LOW (ref 150–400)
POTASSIUM SERPL-MCNC: 4.3 MMOL/L — SIGNIFICANT CHANGE UP (ref 3.5–5.3)
POTASSIUM SERPL-MCNC: 4.3 MMOL/L — SIGNIFICANT CHANGE UP (ref 3.5–5.3)
POTASSIUM SERPL-MCNC: 4.5 MMOL/L — SIGNIFICANT CHANGE UP (ref 3.5–5.3)
POTASSIUM SERPL-SCNC: 4.3 MMOL/L — SIGNIFICANT CHANGE UP (ref 3.5–5.3)
POTASSIUM SERPL-SCNC: 4.3 MMOL/L — SIGNIFICANT CHANGE UP (ref 3.5–5.3)
POTASSIUM SERPL-SCNC: 4.5 MMOL/L — SIGNIFICANT CHANGE UP (ref 3.5–5.3)
PROT SERPL-MCNC: 6.2 G/DL — LOW (ref 6.4–8.2)
PROT UR-MCNC: (no result) MG/DL
PROTHROM AB SERPL-ACNC: 28.7 SEC — HIGH (ref 9.8–12.7)
RBC # BLD: 2.99 M/UL — LOW (ref 3.8–5.2)
RBC # FLD: 21.9 % — HIGH (ref 10.3–16.9)
RBC CASTS # UR COMP ASSIST: (no result) /HPF
SODIUM SERPL-SCNC: 152 MMOL/L — HIGH (ref 135–145)
SODIUM SERPL-SCNC: 154 MMOL/L — HIGH (ref 135–145)
SODIUM SERPL-SCNC: 155 MMOL/L — HIGH (ref 135–145)
SP GR SPEC: 1.02 — SIGNIFICANT CHANGE UP (ref 1–1.03)
TROPONIN I SERPL-MCNC: 0.04 NG/ML — SIGNIFICANT CHANGE UP (ref 0.01–0.04)
TSH SERPL-MCNC: 3.62 UIU/ML — SIGNIFICANT CHANGE UP (ref 0.35–4.94)
UROBILINOGEN FLD QL: 0.2 E.U./DL — SIGNIFICANT CHANGE UP
WBC # BLD: 8.2 K/UL — SIGNIFICANT CHANGE UP (ref 3.8–10.5)
WBC # FLD AUTO: 8.2 K/UL — SIGNIFICANT CHANGE UP (ref 3.8–10.5)
WBC UR QL: (no result) /HPF

## 2017-04-25 PROCEDURE — 99497 ADVNCD CARE PLAN 30 MIN: CPT | Mod: 25

## 2017-04-25 PROCEDURE — 70450 CT HEAD/BRAIN W/O DYE: CPT | Mod: 26

## 2017-04-25 PROCEDURE — 93010 ELECTROCARDIOGRAM REPORT: CPT

## 2017-04-25 PROCEDURE — 99223 1ST HOSP IP/OBS HIGH 75: CPT

## 2017-04-25 PROCEDURE — 72125 CT NECK SPINE W/O DYE: CPT | Mod: 26

## 2017-04-25 PROCEDURE — 74020: CPT | Mod: 26

## 2017-04-25 PROCEDURE — 99291 CRITICAL CARE FIRST HOUR: CPT | Mod: 25

## 2017-04-25 PROCEDURE — 71010: CPT | Mod: 26

## 2017-04-25 RX ORDER — DEXTROSE 50 % IN WATER 50 %
12.5 SYRINGE (ML) INTRAVENOUS ONCE
Qty: 0 | Refills: 0 | Status: COMPLETED | OUTPATIENT
Start: 2017-04-25 | End: 2017-04-25

## 2017-04-25 RX ORDER — MINERAL OIL
133 OIL (ML) MISCELLANEOUS
Qty: 0 | Refills: 0 | Status: DISCONTINUED | OUTPATIENT
Start: 2017-04-25 | End: 2017-04-26

## 2017-04-25 RX ORDER — PIPERACILLIN AND TAZOBACTAM 4; .5 G/20ML; G/20ML
INJECTION, POWDER, LYOPHILIZED, FOR SOLUTION INTRAVENOUS
Qty: 0 | Refills: 0 | Status: DISCONTINUED | OUTPATIENT
Start: 2017-04-25 | End: 2017-04-26

## 2017-04-25 RX ORDER — HEPARIN SODIUM 5000 [USP'U]/ML
5000 INJECTION INTRAVENOUS; SUBCUTANEOUS EVERY 8 HOURS
Qty: 0 | Refills: 0 | Status: DISCONTINUED | OUTPATIENT
Start: 2017-04-25 | End: 2017-04-25

## 2017-04-25 RX ORDER — VANCOMYCIN HCL 1 G
750 VIAL (EA) INTRAVENOUS EVERY 24 HOURS
Qty: 0 | Refills: 0 | Status: DISCONTINUED | OUTPATIENT
Start: 2017-04-25 | End: 2017-04-26

## 2017-04-25 RX ORDER — INSULIN LISPRO 100/ML
VIAL (ML) SUBCUTANEOUS EVERY 4 HOURS
Qty: 0 | Refills: 0 | Status: DISCONTINUED | OUTPATIENT
Start: 2017-04-25 | End: 2017-04-26

## 2017-04-25 RX ORDER — DEXTROSE 50 % IN WATER 50 %
1 SYRINGE (ML) INTRAVENOUS ONCE
Qty: 0 | Refills: 0 | Status: DISCONTINUED | OUTPATIENT
Start: 2017-04-25 | End: 2017-04-26

## 2017-04-25 RX ORDER — SODIUM CHLORIDE 9 MG/ML
500 INJECTION INTRAMUSCULAR; INTRAVENOUS; SUBCUTANEOUS
Qty: 0 | Refills: 0 | Status: COMPLETED | OUTPATIENT
Start: 2017-04-25 | End: 2017-04-25

## 2017-04-25 RX ORDER — DEXTROSE 50 % IN WATER 50 %
25 SYRINGE (ML) INTRAVENOUS ONCE
Qty: 0 | Refills: 0 | Status: DISCONTINUED | OUTPATIENT
Start: 2017-04-25 | End: 2017-04-26

## 2017-04-25 RX ORDER — PANTOPRAZOLE SODIUM 20 MG/1
1 TABLET, DELAYED RELEASE ORAL
Qty: 0 | Refills: 0 | COMMUNITY

## 2017-04-25 RX ORDER — PIPERACILLIN AND TAZOBACTAM 4; .5 G/20ML; G/20ML
3.38 INJECTION, POWDER, LYOPHILIZED, FOR SOLUTION INTRAVENOUS EVERY 6 HOURS
Qty: 0 | Refills: 0 | Status: DISCONTINUED | OUTPATIENT
Start: 2017-04-26 | End: 2017-04-26

## 2017-04-25 RX ORDER — DEXTROSE 50 % IN WATER 50 %
25 SYRINGE (ML) INTRAVENOUS ONCE
Qty: 0 | Refills: 0 | Status: COMPLETED | OUTPATIENT
Start: 2017-04-25 | End: 2017-04-25

## 2017-04-25 RX ORDER — GLUCAGON INJECTION, SOLUTION 0.5 MG/.1ML
1 INJECTION, SOLUTION SUBCUTANEOUS ONCE
Qty: 0 | Refills: 0 | Status: DISCONTINUED | OUTPATIENT
Start: 2017-04-25 | End: 2017-04-26

## 2017-04-25 RX ORDER — SODIUM CHLORIDE 9 MG/ML
1000 INJECTION, SOLUTION INTRAVENOUS
Qty: 0 | Refills: 0 | Status: DISCONTINUED | OUTPATIENT
Start: 2017-04-25 | End: 2017-04-26

## 2017-04-25 RX ORDER — SODIUM CHLORIDE 9 MG/ML
1000 INJECTION, SOLUTION INTRAVENOUS
Qty: 0 | Refills: 0 | Status: DISCONTINUED | OUTPATIENT
Start: 2017-04-25 | End: 2017-04-25

## 2017-04-25 RX ORDER — SODIUM CHLORIDE 9 MG/ML
500 INJECTION, SOLUTION INTRAVENOUS
Qty: 0 | Refills: 0 | Status: DISCONTINUED | OUTPATIENT
Start: 2017-04-25 | End: 2017-04-26

## 2017-04-25 RX ORDER — SODIUM CHLORIDE 9 MG/ML
3 INJECTION INTRAMUSCULAR; INTRAVENOUS; SUBCUTANEOUS ONCE
Qty: 0 | Refills: 0 | Status: COMPLETED | OUTPATIENT
Start: 2017-04-25 | End: 2017-04-25

## 2017-04-25 RX ORDER — DEXTROSE 50 % IN WATER 50 %
12.5 SYRINGE (ML) INTRAVENOUS ONCE
Qty: 0 | Refills: 0 | Status: DISCONTINUED | OUTPATIENT
Start: 2017-04-25 | End: 2017-04-26

## 2017-04-25 RX ORDER — PIPERACILLIN AND TAZOBACTAM 4; .5 G/20ML; G/20ML
3.38 INJECTION, POWDER, LYOPHILIZED, FOR SOLUTION INTRAVENOUS ONCE
Qty: 0 | Refills: 0 | Status: COMPLETED | OUTPATIENT
Start: 2017-04-25 | End: 2017-04-25

## 2017-04-25 RX ORDER — CEFTRIAXONE 500 MG/1
1 INJECTION, POWDER, FOR SOLUTION INTRAMUSCULAR; INTRAVENOUS ONCE
Qty: 0 | Refills: 0 | Status: COMPLETED | OUTPATIENT
Start: 2017-04-25 | End: 2017-04-25

## 2017-04-25 RX ADMIN — Medication 133 MILLILITER(S): at 22:07

## 2017-04-25 RX ADMIN — SODIUM CHLORIDE 999 MILLILITER(S): 9 INJECTION, SOLUTION INTRAVENOUS at 23:27

## 2017-04-25 RX ADMIN — PIPERACILLIN AND TAZOBACTAM 200 GRAM(S): 4; .5 INJECTION, POWDER, LYOPHILIZED, FOR SOLUTION INTRAVENOUS at 21:32

## 2017-04-25 RX ADMIN — CEFTRIAXONE 100 GRAM(S): 500 INJECTION, POWDER, FOR SOLUTION INTRAMUSCULAR; INTRAVENOUS at 16:45

## 2017-04-25 RX ADMIN — Medication 150 MILLIGRAM(S): at 23:25

## 2017-04-25 RX ADMIN — SODIUM CHLORIDE 100 MILLILITER(S): 9 INJECTION, SOLUTION INTRAVENOUS at 21:29

## 2017-04-25 RX ADMIN — SODIUM CHLORIDE 2000 MILLILITER(S): 9 INJECTION INTRAMUSCULAR; INTRAVENOUS; SUBCUTANEOUS at 15:25

## 2017-04-25 RX ADMIN — SODIUM CHLORIDE 2000 MILLILITER(S): 9 INJECTION INTRAMUSCULAR; INTRAVENOUS; SUBCUTANEOUS at 15:40

## 2017-04-25 RX ADMIN — SODIUM CHLORIDE 2000 MILLILITER(S): 9 INJECTION INTRAMUSCULAR; INTRAVENOUS; SUBCUTANEOUS at 16:00

## 2017-04-25 RX ADMIN — SODIUM CHLORIDE 3 MILLILITER(S): 9 INJECTION INTRAMUSCULAR; INTRAVENOUS; SUBCUTANEOUS at 14:46

## 2017-04-25 RX ADMIN — Medication 12.5 GRAM(S): at 21:34

## 2017-04-25 RX ADMIN — Medication 25 GRAM(S): at 21:33

## 2017-04-25 RX ADMIN — SODIUM CHLORIDE 2000 MILLILITER(S): 9 INJECTION INTRAMUSCULAR; INTRAVENOUS; SUBCUTANEOUS at 14:31

## 2017-04-25 NOTE — ED PROVIDER NOTE - MEDICAL DECISION MAKING DETAILS
Pt with multiple medical conditions, recent admission for sepsis due to gangrenous finger. Noted to have been confused earlier. Currently AAOx3, No findings of trauma on exam, abdomen distended yet not tender, CXR with dilated colon, AXR with distended small bowl and copious stool, consistent with previous recent films and CT likely due to fecal impaction. Pt CXR clear of PNA, labs approximate recent baseline. HCT without trauma or bleed given pt on Eliquis and Neck CT also without trauma. Pt hypothermic, kept on warmer and will continue such. Lactate negative. UA with evidnece for infection so treated for such. Previous decub ulcers noted to left hip/glut without evidence for infection. Case management discussed with sister who is health care proxy and pt made DNR/DNI with molst signed in the ED. Will admit to continue warming and treatment with fluid and abx.

## 2017-04-25 NOTE — H&P ADULT - PROBLEM SELECTOR PLAN 2
Patient found to be 93 then 92 degrees via rectal temperature. As patient has Raynaud's unclear as to accuracy of this temperature. Patient did not meet SIRS criteria technically. Unclear etiology at this time. Patient found to be 93 then 92 degrees via rectal temperature and axillary temperature of 94.   As patient has Raynaud's unclear as to accuracy of axillary temperature and fecal impaction/rectal distention. Patient did not meet SIRS criteria technically. Unclear etiology at this time.

## 2017-04-25 NOTE — ED ADULT NURSE REASSESSMENT NOTE - NS ED NURSE REASSESS COMMENT FT1
MD Leon successful with IV placement to L EJ, labs collected. Pt straight cathed for UA/UC, pt tolerated procedure well. Pt remains alert, VSS. Pt to be transported to CT scan. Will continue to monitor.

## 2017-04-25 NOTE — ED PROVIDER NOTE - CARE PLAN
Principal Discharge DX:	Urinary tract infection without hematuria, site unspecified  Secondary Diagnosis:	Hypothermia, initial encounter

## 2017-04-25 NOTE — H&P ADULT - HISTORY OF PRESENT ILLNESS
Information is obtained from the chart and ED sign-out as patient is unable to provide history and unable to reach HCP, number in chart.     72 y/o female (poor historian) with pmhx of bipolar dx, schizophrenia, parkinson's, DM, PVD with suspected Raynaud's s/p right middle finger amputation (10/08/16) presented today with her HHA from home to her PCP office, [address], after allegedly being found on the floor at home yesterday. She was found by her HHA on the floor, seemed to be confused, but otherwise alright, so they stayed at home and went to her PCP office today. After arriving at her PCP office Information is obtained from the chart and ED sign-out as patient is unable to provide history and unable to reach HCP, number in chart.     72 y/o female (poor historian) with pmhx of bipolar dx, schizophrenia, parkinson's, DM, PVD with suspected Raynaud's s/p right middle finger amputation (10/08/16) presented today with her HHA from home to her PCP office, [address], after allegedly being found on the floor at home yesterday. She was found by her HHA on the floor, seemed to be confused, but otherwise alright, so they stayed at home and went to her PCP office today. After arriving at her PCP office, she was sent to the ED.     When arrived at the ED, she was AAOx2, answering questions and complaining of being cold.   She was found to be profoundly hypothermic to 93.6F (rectal) initially. /59. HR 85.   She was placed on a Rosa Hugger, given Ceftriaxone 1GM, and 2L NS.     Conversation was had with sister (HCP) by ED attending who made patient DNR/DNI. Information is obtained from the chart and ED sign-out as patient is unable to provide history and unable to reach HCP, number in chart.     72 y/o female (poor historian) with pmhx of bipolar dx, schizophrenia, parkinson's, DM, PVD with suspected Raynaud's s/p right middle finger amputation (10/08/16) presented today with her HHA from home to her PCP office, [address], after allegedly being found on the floor at home yesterday. She was found by her HHA on the floor, seemed to be confused, but otherwise alright, so they stayed at home and went to her PCP office today. After arriving at her PCP office, she was sent to the ED.     When arrived at the ED, she was AAOx2, answering questions and complaining of being cold.   She was found to be profoundly hypothermic to 93.6F (rectal) initially. /59. HR 85.   She was placed on a Rosa Hugger, given Ceftriaxone 1GM, and 2L NS.   NCCT with no acute intracranial pathology    Conversation was had with sister (HCP) by ED attending who made patient DNR/DNI.

## 2017-04-25 NOTE — ED ADULT NURSE NOTE - OBJECTIVE STATEMENT
Pt presents to ED c/o fall. Per HHA pt reports she fell out of wheelchair Friday or Saturday, was on floor until HHA found her Monday morning. HHA reports yesterday pt was A/Ox3 though a little drowsy and c/o neck pain s/p fall, but they had a PMD appointment today so decided to wait to be evaluated. HHA reports pt today less alert and oriented, went to PMD's office and were told to come to [facility] ED. On arrival to ED pt presents cacectic, A/Ox3 though slow to respond. Pt rectal temp 93.6 on arrival to ED, pt moved to resus 1 and upgraded to MD Leon. Labs and CXR in progress at this time. No obvious deformities noted.

## 2017-04-25 NOTE — H&P ADULT - NSHPSOCIALHISTORY_GEN_ALL_CORE
Lives at home with HHA.     Social History:  · Marital Status	Single  · Lives With	alone    Substance Use History:  · Substance Use	never used    Alcohol Use History:  · Have you ever consumed alcohol	never    Tobacco Usage:  · Tobacco Usage: Never smoker    Passive Smoke Exposure:  · Passive Smoke Exposure	Unknown

## 2017-04-25 NOTE — H&P ADULT - NSHPLABSRESULTS_GEN_ALL_CORE
.  LABS:                         8.9    8.2   )-----------( 28       ( 2017 14:23 )             26.0         154<H>  |  127<H>  |  44<H>  ----------------------------<  59<L>  4.5   |  14<L>  |  1.15    Ca    7.1<L>      2017 14:47    TPro  6.2<L>  /  Alb  2.2<L>  /  TBili  0.3  /  DBili  x   /  AST  95<H>  /  ALT  20  /  AlkPhos  86        Urinalysis Basic - ( 2017 15:01 )    Color: Yellow / Appearance: SL CLOUDY / S.020 / pH: x  Gluc: x / Ketone: NEGATIVE  / Bili: NEGATIVE / Urobili: 0.2 E.U./dL   Blood: x / Protein: Trace mg/dL / Nitrite: NEGATIVE   Leuk Esterase: Large / RBC: Many /HPF / WBC Many /HPF   Sq Epi: x / Non Sq Epi: Rare /HPF / Bacteria: Many /HPF            Lactate, Blood: 2.2 mmoL/L ( @ 19:16)      RADIOLOGY, EKG & ADDITIONAL TESTS: Reviewed.

## 2017-04-25 NOTE — H&P ADULT - NSHPOUTPATIENTPROVIDERS_GEN_ALL_CORE
Dr. Gomez ([facility]) Dr. Gomez ([facility]) [phone]

## 2017-04-25 NOTE — ED ADULT NURSE NOTE - NS PRO AD PATIENT TYPE
Health Care Proxy (HCP) Medical Orders for Life-Sustaining Treatment (MOLST)/Health Care Proxy (HCP)/Do Not Resuscitate (DNR)

## 2017-04-25 NOTE — H&P ADULT - PROBLEM SELECTOR PLAN 6
Urinalysis was positive, and in setting of hypothermia and AMS will treat with Vancomycin 750mg q24hrs and Zosyn 3.375q6 for empiric coverage.  Follow up urine culture  Follow up blood culture

## 2017-04-25 NOTE — H&P ADULT - NSHPREVIEWOFSYSTEMS_GEN_ALL_CORE
Patient states that "it hurts" when pointing to abdomen.   Otherwise, ROS unable to be obtained due to AMS.

## 2017-04-25 NOTE — H&P ADULT - PROBLEM SELECTOR PLAN 8
Found to be hypoglycemic when patient came to floor FS 51. Given 1 AMP d50, will recheck in 15 minutes.   Has history of DM not on meds, will check fingersticks q4, and MDISS.

## 2017-04-25 NOTE — ED PROVIDER NOTE - OBJECTIVE STATEMENT
70yo woman with h/o bipolar, renayuds syndrome, DM , gangrene, wheelchair bound found to have fallen and was on the floor of the apartment on Monday. Had last been seen possibly friday. Pt was helped up and seemed fine yesterday however today seemed a bit confused and c/o stiff neck so HHA took her to the PMD who referred her here. Pt AAOx2 initially, denied HA, neck pain, CP, SOB and abdominal pain, only complaint was that she was cold. Unknown recent F/C, no noted V/D at home.

## 2017-04-25 NOTE — ED PROVIDER NOTE - PHYSICAL EXAMINATION
CONSTITUTIONAL: Well-appearing; well-nourished; in no apparent distress.   HEAD: Normocephalic; atraumatic.   EYES: PERRL; EOM intact; conjunctiva and sclera clear  ENT: normal nose; no rhinorrhea; normal pharynx with no erythema or lesions.   NECK: Supple; non-tender along midline C-spine;   CARDIOVASCULAR: Normal S1, S2; no murmurs, rubs, or gallops. Regular rate and rhythm.   RESPIRATORY: Breathing easily; breath sounds clear and equal bilaterally; no wheezes, rhonchi, or rales.  GI: Soft; distended; non-tender; no palpable organomegaly.   EXT: BUE with cool fingers, + amputation to the left 3rd and right 2nd fingertips, decreased cap refill noted; + decreased cap refill and temp to touch of B/L feet  SKIN: Normal for age and race; warm; + stage 2 ulcer to the left hip/glute  NEURO: A & O x 2; face symmetric; nml gross motor movement; grossly unremarkable.   PSYCHOLOGICAL: The patient’s mood and manner are appropriate.

## 2017-04-25 NOTE — ED ADULT NURSE REASSESSMENT NOTE - NS ED NURSE REASSESS COMMENT FT1
US IV placement in progress by MD Leon at this time. Pt pending labs. Pt remains A/Ox3. Will continue to monitor.

## 2017-04-25 NOTE — H&P ADULT - PROBLEM SELECTOR PLAN 1
Patient found to have markedly distended, but non-acute, abdomen on exam.     Abdominal XR showed distended loops of bowel, without air-fluid levels. No free air.   Rectal exam showed no stool in rectal vault, but dilated rectum and blood seen on glove.    Will start with tap water enema, followed by mineral water enemas q2hrs until bowel movement.  Will obtain CT angio abdomen to r/o ischemic bowel vs. sterocolitis Patient found to have markedly distended, but non-acute, abdomen on exam.     Abdominal XR showed distended loops of bowel, without air-fluid levels. No free air.   Rectal exam showed no stool in rectal vault, but dilated rectum and blood seen on glove.    Will start with tap water enema, followed by mineral water enemas q2hrs until bowel movement.  Will obtain CT angio abdomen to r/o ischemic bowel vs. sterco-colitis Patient found to have markedly distended, but non-acute, abdomen on exam. Patient hemodynamically stable, without grossly elevated lactic acid, very unlikely perforation.   Abdominal XR showed distended loops of bowel, without air-fluid levels. No free air.   Rectal exam showed no stool in rectal vault, but dilated rectum and blood seen on glove.    Ileus is likely reason for decreased PO intake and hypernatremia. Ileus is also likely reason for Lactate 2.2-- will follow up 10PM and trend    Will start with tap water enema, followed by mineral water enemas q2hrs until bowel movement.  Will obtain CT angio abdomen to r/o perforation vs. ischemic bowel vs. sterco-colitis    If patient acute decompensates, however, will emergently order imaging and have surgery on board. Patient found to have markedly distended, but non-acute, abdomen on exam. Rectal exam showed no stool in rectal vault, but dilated rectum and blood seen on glove. However, patient hemodynamically stable, without grossly elevated lactic acid, low concern for perforation or acute abdomen. Will monitor very closely over next few hours and if decompensation, will have stat imaging, escalation of care, and surgical consult.     Abdominal XR showed distended loops of bowel, without air-fluid levels. No free air.   Ileus is likely reason for decreased PO intake and hypernatremia. Ileus is also likely reason for Lactate 2.2-- will follow up 10PM and trend    Will start with tap water enema, followed by mineral water enemas q2hrs until bowel movement.  Will obtain CT angio abdomen to r/o perforation vs. ischemic bowel vs. sterco-colitis

## 2017-04-25 NOTE — H&P ADULT - ATTENDING COMMENTS
71F hx bipolar dx, schizophrenia, parkinson's, DM, PAD s/p digital amputations, multiple decubiti who presented with AMS after being found down at home. Pt found to by hypothermic and initially with severe sepsis progressing to septic shock overnight. Pt made DNR/DNI in ED and admitted for UTI, however, after examination, pt had acute abdomen, so CT A/P done which showed a high grade SBO with pneumointestinalis concerning for bowel ischemia. Surgery deemed her a poor surgical candidate. As the patient's VS were destabilizing, I spoke with her sister and HCP, who expressed desire to make patient comfort care (please see my chart note for further information). Will continue abx, IVF, and morphine PRN pain/resp distress, but DC blood draws, VS checks, NGT, etc. Poor prognosis and pt may  tonight. Pt's sister to come to hospital in AM.    ACP/GoC planning >30mins

## 2017-04-25 NOTE — H&P ADULT - PROBLEM SELECTOR PLAN 7
Patient with recent admission for Patient with recent admission for gangrenous left 3rd digit due to Raynaud' s phenomenon in February, now s/p amputation of half of finger.   Finger is  to palpation with eschar on tip.   Patient was to follow up with Dr. Elijah Jarrett after last discharge, but unclear if did. Will need collateral in AM.

## 2017-04-25 NOTE — H&P ADULT - PROBLEM SELECTOR PLAN 9
NOTE: Called Pharmacy and only meds active are Bromonodine 2% otic drop, 1 drop bid   Depakote ER 500mg bid   Seroquel 100mg qHS.   It is notable however that she was set to run out of her Depakote and Seroquel in March and has not refilled them.     Will hold Depakote and Seroquel as patient NPO and likely not taking them.     #Parkinson's Disease: Unclear if on any meds.   #Bipolar Disorder/Schizophrenia: Will hold Depakote     #Sacral decubitus ulcer: Stage 3-4, granulated. Unlikely source of infection. Wound care     NPO as aspiration risk. Aspiration precautions.

## 2017-04-25 NOTE — H&P ADULT - PROBLEM SELECTOR PLAN 4
Hypernatremia is likely hypovolemic hypernatremia, but this seems to actually be at her baseline Na seen from previous admissions. Will start 100cc/hr D5W + 1 AMP Bicarb to correct Na slowly. Goal 8-10 over 24/hrs, Hypernatremia is likely hypovolemic hypernatremia, but this seems to actually be at her baseline Na seen from previous admissions. Will start 100cc/hr D5W + 1 AMP Bicarb to correct Na slowly. Goal 8-10 over 24 hrs.

## 2017-04-25 NOTE — H&P ADULT - NSHPPHYSICALEXAM_GEN_ALL_CORE
General: Lethargic but arousable with loud auditory stimulation, or physical stimulation. Appears much older than stated age. cachetic  HEENT: particles of food found in mouth, dry MM   Neck: supple, no jvd  Cardiac: RRR s1s2 no murmurs  Pulm: Poor inspiratory effort, CTA otherwise  Abdomen: markedly distended, hypoactive bowel sounds, grimace upon deep palpation of abdomen  Extremities: Cool, cyanotic fingertips  Vasc: 2+ distal pulses  Neurological: AAOx2, follows only basic commands such as "raise your arm" but not "raise your left arm" General: Lethargic but arousable with loud auditory stimulation, or physical stimulation. Appears much older than stated age. cachetic  HEENT: particles of food found in mouth, dry MM   Neck: supple, no jvd  Cardiac: RRR s1s2 no murmurs  Pulm: Poor inspiratory effort, CTA otherwise  Abdomen: markedly distended, hypoactive bowel sounds, grimace upon deep palpation of abdomen  Rectal: No stool in rectal vault, but gross blood seen on glove.   Extremities: Cool, cyanotic fingertips, Left middle finger (previous amputation) very tender to palpation with edema, and an eschar over tip.   Vasc: 2+ distal pulses  Neurological: AAOx2, follows only basic commands such as "raise your arm" but not "raise your left arm"

## 2017-04-25 NOTE — H&P ADULT - PROBLEM SELECTOR PLAN 5
Patient has extensive history of thrombocytopenia and anemia, but unclear diagnosis. Had BM Bx last admission which did not provide evidence.  No sign of active bleeding at this time.    Patient was to follow up with Dr. Lopez as per last chart. Collateral in AM. Patient has extensive history of thrombocytopenia and anemia, but unclear diagnosis. Had BM Bx last admission which did not provide evidence.  She is at baseline HgB (now 8.9, baseline 8-9)  No sign of active bleeding at this time.    Patient was to follow up with Dr. Lopez as per last chart. Collateral in AM.

## 2017-04-25 NOTE — H&P ADULT - ASSESSMENT
72 y/o female (poor historian) with pmhx of bipolar dx, schizophrenia, parkinson's, DM, PVD with suspected Raynaud's s/p right middle finger amputation (10/08/16) admitted for AMS, hypothermia due to UTI.

## 2017-04-25 NOTE — H&P ADULT - PROBLEM SELECTOR PLAN 3
AMS etiology is unclear, but likely from UTI vs. abdominal infection vs. gangrene of finger.   She is currently protecting airway.

## 2017-04-25 NOTE — ED PROVIDER NOTE - CRITICAL CARE PROVIDED
interpretation of diagnostic studies/additional history taking/consultation with other physicians/consult w/ pt's family directly relating to pts condition/direct patient care (not related to procedure)

## 2017-04-26 VITALS
RESPIRATION RATE: 30 BRPM | TEMPERATURE: 101 F | DIASTOLIC BLOOD PRESSURE: 62 MMHG | HEART RATE: 116 BPM | OXYGEN SATURATION: 96 % | SYSTOLIC BLOOD PRESSURE: 98 MMHG

## 2017-04-26 DIAGNOSIS — G92 TOXIC ENCEPHALOPATHY: ICD-10-CM

## 2017-04-26 DIAGNOSIS — N39.0 URINARY TRACT INFECTION, SITE NOT SPECIFIED: ICD-10-CM

## 2017-04-26 DIAGNOSIS — N17.9 ACUTE KIDNEY FAILURE, UNSPECIFIED: ICD-10-CM

## 2017-04-26 DIAGNOSIS — A41.9 SEPSIS, UNSPECIFIED ORGANISM: ICD-10-CM

## 2017-04-26 DIAGNOSIS — Z29.9 ENCOUNTER FOR PROPHYLACTIC MEASURES, UNSPECIFIED: ICD-10-CM

## 2017-04-26 DIAGNOSIS — K56.69 OTHER INTESTINAL OBSTRUCTION: ICD-10-CM

## 2017-04-26 DIAGNOSIS — Z51.5 ENCOUNTER FOR PALLIATIVE CARE: ICD-10-CM

## 2017-04-26 LAB
ALBUMIN SERPL ELPH-MCNC: 2.1 G/DL — LOW (ref 3.4–5)
GRAM STN FLD: SIGNIFICANT CHANGE UP
LACTATE SERPL-SCNC: 6.9 MMOL/L — CRITICAL HIGH (ref 0.5–2)

## 2017-04-26 PROCEDURE — 99223 1ST HOSP IP/OBS HIGH 75: CPT

## 2017-04-26 PROCEDURE — 74177 CT ABD & PELVIS W/CONTRAST: CPT | Mod: 26

## 2017-04-26 PROCEDURE — 99233 SBSQ HOSP IP/OBS HIGH 50: CPT

## 2017-04-26 RX ORDER — MORPHINE SULFATE 50 MG/1
2 CAPSULE, EXTENDED RELEASE ORAL
Qty: 0 | Refills: 0 | Status: DISCONTINUED | OUTPATIENT
Start: 2017-04-26 | End: 2017-04-26

## 2017-04-26 RX ORDER — MORPHINE SULFATE 50 MG/1
2 CAPSULE, EXTENDED RELEASE ORAL
Qty: 0 | Refills: 0 | Status: DISCONTINUED | OUTPATIENT
Start: 2017-04-26 | End: 2017-04-27

## 2017-04-26 RX ORDER — DEXTROSE 50 % IN WATER 50 %
50 SYRINGE (ML) INTRAVENOUS ONCE
Qty: 0 | Refills: 0 | Status: COMPLETED | OUTPATIENT
Start: 2017-04-26 | End: 2017-04-26

## 2017-04-26 RX ORDER — SODIUM CHLORIDE 9 MG/ML
500 INJECTION INTRAMUSCULAR; INTRAVENOUS; SUBCUTANEOUS ONCE
Qty: 0 | Refills: 0 | Status: COMPLETED | OUTPATIENT
Start: 2017-04-26 | End: 2017-04-26

## 2017-04-26 RX ADMIN — MORPHINE SULFATE 2 MILLIGRAM(S): 50 CAPSULE, EXTENDED RELEASE ORAL at 16:32

## 2017-04-26 RX ADMIN — MORPHINE SULFATE 2 MILLIGRAM(S): 50 CAPSULE, EXTENDED RELEASE ORAL at 22:02

## 2017-04-26 RX ADMIN — MORPHINE SULFATE 2 MILLIGRAM(S): 50 CAPSULE, EXTENDED RELEASE ORAL at 22:17

## 2017-04-26 RX ADMIN — MORPHINE SULFATE 2 MILLIGRAM(S): 50 CAPSULE, EXTENDED RELEASE ORAL at 08:32

## 2017-04-26 RX ADMIN — MORPHINE SULFATE 2 MILLIGRAM(S): 50 CAPSULE, EXTENDED RELEASE ORAL at 20:00

## 2017-04-26 RX ADMIN — SODIUM CHLORIDE 2000 MILLILITER(S): 9 INJECTION INTRAMUSCULAR; INTRAVENOUS; SUBCUTANEOUS at 02:46

## 2017-04-26 RX ADMIN — MORPHINE SULFATE 2 MILLIGRAM(S): 50 CAPSULE, EXTENDED RELEASE ORAL at 18:00

## 2017-04-26 RX ADMIN — MORPHINE SULFATE 2 MILLIGRAM(S): 50 CAPSULE, EXTENDED RELEASE ORAL at 06:18

## 2017-04-26 RX ADMIN — PIPERACILLIN AND TAZOBACTAM 200 GRAM(S): 4; .5 INJECTION, POWDER, LYOPHILIZED, FOR SOLUTION INTRAVENOUS at 06:18

## 2017-04-26 RX ADMIN — MORPHINE SULFATE 2 MILLIGRAM(S): 50 CAPSULE, EXTENDED RELEASE ORAL at 14:02

## 2017-04-26 RX ADMIN — Medication 133 MILLILITER(S): at 02:47

## 2017-04-26 RX ADMIN — Medication 133 MILLILITER(S): at 00:52

## 2017-04-26 RX ADMIN — MORPHINE SULFATE 2 MILLIGRAM(S): 50 CAPSULE, EXTENDED RELEASE ORAL at 12:16

## 2017-04-26 RX ADMIN — Medication 50 MILLILITER(S): at 02:46

## 2017-04-26 RX ADMIN — MORPHINE SULFATE 2 MILLIGRAM(S): 50 CAPSULE, EXTENDED RELEASE ORAL at 18:01

## 2017-04-26 RX ADMIN — MORPHINE SULFATE 2 MILLIGRAM(S): 50 CAPSULE, EXTENDED RELEASE ORAL at 14:32

## 2017-04-26 RX ADMIN — MORPHINE SULFATE 2 MILLIGRAM(S): 50 CAPSULE, EXTENDED RELEASE ORAL at 17:00

## 2017-04-26 RX ADMIN — MORPHINE SULFATE 2 MILLIGRAM(S): 50 CAPSULE, EXTENDED RELEASE ORAL at 12:45

## 2017-04-26 NOTE — PROGRESS NOTE ADULT - PROBLEM SELECTOR PLAN 1
-  Pt initially presented w/ sepsis -  Pt initially presented w/ severe sepsis in the setting of ischemic colitis and pneumatosis intestinalis vs. UTI w/ +UA and urine cx. Pt's one set of blood cx growing GPC, however, likely contamination. s/p Vanc/Zosyn for empiric coverage. Surgery consulted, however, no intervention planned. Hypotensive overnight, however, GOC discussion held and pt is made comfort care.  - c/w symptomatic management per Palliatve care consult

## 2017-04-26 NOTE — PROVIDER CONTACT NOTE (CRITICAL VALUE NOTIFICATION) - TEST AND RESULT REPORTED:
lactical acid 6.9
positive blood cultures  anerobic bottle from 4/25/17 positive for gram + cocci in pairs and clusters

## 2017-04-26 NOTE — PROGRESS NOTE ADULT - ATTENDING COMMENTS
Pt seen and examined by me at bedside earlier in AM with housestaff. Agree with above exam/a/p as noted above with additions. Appreciate palliative consult. DNR/DNI-comfortable care. d/c IV abx and IVF.

## 2017-04-26 NOTE — CONSULT NOTE ADULT - PROBLEM SELECTOR RECOMMENDATION 9
pt is DNR/DNI, on comfort measures only, on ATC morphine at 2mg IVq2h, appearing very comfortable with pt confirming so, can consider switching to morphine sulfate drip at 1mg/h.  Will reach out to sister to discuss hospice

## 2017-04-26 NOTE — CHART NOTE - NSCHARTNOTEFT_GEN_A_CORE
PGY-1 Chart Note     Patient still with severe bowel distention; has not had a bowel movement despite aggressive bowel regimens. Increasing lactate concerning for bowel ischemia. CT abdomen/ pelvis complete, suggestive of high grade bowel obstruction with pneumointestinalis suspicious for bowel ischemia. Surgery consulted.     A/P: 70 y/o F admitted w/ AMS, hypothermia, hypernatremia severe abdominal distention found to have extensive bowel obstruction/ ischemia on CT  - NG tube to intermittent suction  - empirically treat w/ Vancomycin + Zosyn   - warming blankets for hypothermia   - NPO  - c/w D5 w/ bicarb additives @100cc/hr for hypernatremia   - repeat lactate  - f/u surgery rec's

## 2017-04-26 NOTE — CONSULT NOTE ADULT - ASSESSMENT
70 y/o cachectic woman with h/o bipolar d/o, schizophrenia, Parkinson's, DM, PVD with right 3rd and left 2nd digit partial amputation, lung and knee surgery, glaucoma presenting from PMD's office post fall on previous day and AMS, found to be hypothermic with hypoalbumenmia, UTI and Ileus, high grade SBO and pneumatosis

## 2017-04-26 NOTE — CONSULT NOTE ADULT - ASSESSMENT
71F DNR/DNI pt with dementia, altered mental status since admission, Reynauds vasculopathy, admitted for UTI and Ileus, found to have portal venous gas, high grade SBO and pneumatosis.     -No surgical intervention, patient is a poor surgical candidate  -Will Signoff

## 2017-04-26 NOTE — CONSULT NOTE ADULT - SUBJECTIVE AND OBJECTIVE BOX
full note to follow Patient is a 71y old  Female who presents with a chief complaint of Fall/UTI/AMS/Hypothermia (2017 19:24)      HPI:  Information is obtained from the chart and ED sign-out as patient is unable to provide history and unable to reach HCP, number in chart.     72 y/o female (poor historian) with pmhx of bipolar dx, schizophrenia, parkinson's, DM, PVD with suspected Raynaud's s/p right middle finger amputation (10/08/16) presented today with her HHA from home to her PCP office, [address], after allegedly being found on the floor at home yesterday. She was found by her HHA on the floor, seemed to be confused, but otherwise alright, so they stayed at home and went to her PCP office today. After arriving at her PCP office, she was sent to the ED.     When arrived at the ED, she was AAOx2, answering questions and complaining of being cold.   She was found to be profoundly hypothermic to 93.6F (rectal) initially. /59. HR 85.   She was placed on a Rosa Hugger, given Ceftriaxone 1GM, and 2L NS.   NCCT with no acute intracranial pathology    Conversation was had with sister (HCP) by ED attending who made patient DNR/DNI. (2017 19:24)    Surgery Addendum  71F with above history, presents with altered mental status unaccompanied, DNR/DNI, admitted for suspected UTI and Ileus. Found to have worsening abdominal pain and abdominal distension/tenderness. CT scan obtain at midnight with a read around 1am consistent with portal venous gas and suggestive of ischemic bowel. Patient seen at bedside, cachectic, lethargic, not answering questions.     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  PVD (peripheral vascular disease)  Glaucoma  Bipolar disorder  Schizophrenia  DM (diabetes mellitus)  H/O eye surgery  S/P exploratory laparotomy  H/O knee surgery  S/P lung surgery, follow-up exam    FAMILY HISTORY:  No pertinent family history in first degree relatives    SOCIAL HISTORY:  Smoking Status: [ ] Current, [ ] Former, [ ] Never  Pack Years:    MEDICATIONS:  Pulmonary:    Antimicrobials:  vancomycin  IVPB 750milliGRAM(s) IV Intermittent every 24 hours  piperacillin/tazobactam IVPB.  IV Intermittent   piperacillin/tazobactam IVPB. 3.375Gram(s) IV Intermittent every 6 hours    Anticoagulants:    Onc:    GI/:  mineral oil enema 133milliLiter(s) Rectal every 2 hours    Endocrine:  insulin lispro (HumaLOG) corrective regimen sliding scale  SubCutaneous every 4 hours  dextrose Gel 1Dose(s) Oral once PRN  dextrose 50% Injectable 12.5Gram(s) IV Push once  dextrose 50% Injectable 25Gram(s) IV Push once  dextrose 50% Injectable 25Gram(s) IV Push once  glucagon  Injectable 1milliGRAM(s) IntraMuscular once PRN  dextrose 50% Injectable 50milliLiter(s) IV Push once    Cardiac:    Other Medications:  dextrose 5% 1000milliLiter(s) IV Continuous <Continuous>  dextrose 5%. 1000milliLiter(s) IV Continuous <Continuous>  sodium chloride 0.45%. 500milliLiter(s) IV Continuous <Continuous>  sodium chloride 0.9% Bolus 500milliLiter(s) IV Bolus once    Allergies    No Known Allergies    Intolerances    Vital Signs Last 24 Hrs  T(C): 36.3, Max: 36.3 ( @ 02:00)  T(F): 97.4, Max: 97.4 ( @ 02:00)  HR: 114 (83 - 114)  BP: 75/42 (75/42 - 137/65)  BP(mean): --  RR: 20 (14 - 20)  SpO2: 97% (94% - 97%)    I & Os for current day (as of  @ 02:46)  =============================================  IN: 650 ml / OUT: 0 ml / NET: 650 ml    LABS:  CBC Full  -  ( 2017 14:23 )  WBC Count : 8.2 K/uL  Hemoglobin : 8.9 g/dL  Hematocrit : 26.0 %  Platelet Count - Automated : 28 K/uL  Mean Cell Volume : 87.0 fL  Mean Cell Hemoglobin : 29.8 pg  Mean Cell Hemoglobin Concentration : 34.2 g/dL  Auto Neutrophil # : x  Auto Lymphocyte # : x  Auto Monocyte # : x  Auto Eosinophil # : x  Auto Basophil # : x  Auto Neutrophil % : 77.8 %  Auto Lymphocyte % : 15.7 %  Auto Monocyte % : 6.3 %  Auto Eosinophil % : 0.1 %  Auto Basophil % : 0.1 %        152<H>  |  127<H>  |  45<H>  ----------------------------<  104<H>  4.3   |  13<L>  |  1.19    Ca    6.9<L>      2017 22:52    TPro  6.2<L>  /  Alb  2.2<L>  /  TBili  0.3  /  DBili  x   /  AST  95<H>  /  ALT  20  /  AlkPhos  86      PT/INR - ( 2017 20:31 )   PT: 28.7 sec;   INR: 2.54          PTT - ( 2017 20:31 )  PTT:38.4 sec      Urinalysis Basic - ( 2017 15:01 )    Color: Yellow / Appearance: SL CLOUDY / S.020 / pH: x  Gluc: x / Ketone: NEGATIVE  / Bili: NEGATIVE / Urobili: 0.2 E.U./dL   Blood: x / Protein: Trace mg/dL / Nitrite: NEGATIVE   Leuk Esterase: Large / RBC: Many /HPF / WBC Many /HPF   Sq Epi: x / Non Sq Epi: Rare /HPF / Bacteria: Many /HPF    RADIOLOGY & ADDITIONAL STUDIES (The following images were personally reviewed):    INTERPRETATION:  [facility]  Preliminary Radiology Report     CT Abdomen and Pelvis With Intravenous Contrast    CLINICAL HISTORY:  71 years old, female; Screening exam; Other: Abdominal distention,   concern for obstruction; Patient HX: AMS, abdominal distention,   hypothermic, lactatemia    TECHNIQUE:  Axial computed tomography images of the abdomen and pelvis with   intravenous contrast.  Coronal and sagittal reformatted images were created and reviewed.    CONTRAST:  100 mL of optiray administered intravenously.    EXAM DATE/TIME:  2017 11:59 PM    COMPARISON:  CT ABDOMEN AND PELVIS IC 2017 10:30:26 AM    FINDINGS:  Lower thorax: Mild bibasilar atelectasis. Small bilateral pleural   effusions. Mild fluid distention of the distal esophagus suggestive of   reflux. Scarring and bronchiectasis right lower lobe.    ABDOMEN:  Liver: Unremarkable. No mass.  Gallbladder and bile ducts: Stones at the gallbladder.  Pancreas: Unremarkable. No mass. No ductal dilation.  Spleen: Unremarkable. No splenomegaly.  Adrenals: Unremarkable. No mass.  Kidneys and ureters: Unremarkable. No solid mass. No hydronephrosis.  Stomach and bowel: Extensive portal venous gas of the liver. Marked   distention of small bowel loops consistent with a high-grade distal   obstruction. Pneumatosis intestinalis at distal small bowel loops at the   lower abdomen and pelvis suspicious for bowel ischemia.  Appendix: No findings to suggest acute appendicitis.    PELVIS:  Bladder: Unremarkable.No mass.  Reproductive: Unremarkable as visualized.    ABDOMEN and PELVIS:  Intraperitoneal space: Mild free fluid. No intraperitoneal free air.  Bones/joints: No acute fracture. No dislocation.  Soft tissues: Unremarkable.  Vasculature: Aorta demonstrates mild atherosclerotic calcification. No   abdominal aortic aneurysm.  Lymph nodes: Unremarkable. No enlarged lymph nodes.    IMPRESSION:  1. Mild bibasilar atelectasis. Small bilateral pleural effusions.  2. Extensive portal venous gas of the liver. Marked distention of small   bowel loops consistent with a high-grade distal obstruction. Pneumatosis   intestinalis at distal small bowel loops at the lower abdomen and pelvis   suspicious for bowel ischemia.  3. Mild fluid distention of the distal esophagus suggestive of reflux.  4. Mild free fluid.  5. Cholelithiasis.

## 2017-04-26 NOTE — PROGRESS NOTE ADULT - SUBJECTIVE AND OBJECTIVE BOX
OVERNIGHT EVENTS:    VITAL SIGNS:   Vital Signs Last 24 Hrs  T(F): 97.4, Max: 97.4 (04- @ 02:00)  HR: 114 (83 - 114)  BP: 75/42 (75/42 - 137/65)  RR: 20 (14 - 20)  SpO2: 97% (94% - 97%)  Wt(kg): --    CAPILLARY BLOOD GLUCOSE  201 (07:39)  102 (03:31)  50 (02:00)  134 (21:21)  50 (20:43)      I&O's Summary      PHYSICAL EXAM:  Constitutional: well appearing, WDWN, NAD  HEENT: NCAT, PEERL, sclera non-icteric, no conjunctival pallor  Neck: supple, no JVD  Respiratory: CTA b/l, no wheezes, No rales, No rhonchi  Cardiovascular: RRR, normal s1/s2, no MRG  Gastrointestinal: soft, NTND, +BS, no guarding, no organomegaly  Extremities: WWP, DP/radial pulses 2+ b/l, no edema  Neurological: AAOx 3, responds to commands, moves all extremities, CN 2-12 intact  Musculoskeletal: 5/5 strength throughout    MEDICATIONS  (STANDING):  dextrose 5% 1000milliLiter(s) IV Continuous <Continuous>  vancomycin  IVPB 750milliGRAM(s) IV Intermittent every 24 hours  piperacillin/tazobactam IVPB.  IV Intermittent   piperacillin/tazobactam IVPB. 3.375Gram(s) IV Intermittent every 6 hours    MEDICATIONS  (PRN):  morphine  - Injectable 2milliGRAM(s) IV Push every 2 hours PRN Moderate Pain (4 - 6)      ALLERGIES: No Known Allergies      INTOLERANCES:   LABS:                        8.9    8.2   )-----------(        ( 2017 14:23 )             26.0     04-    152<H>  |  127<H>  |  45<H>  ----------------------------<  104<H>  4.3   |  13<L>  |  1.19    Ca    6.9<L>      2017 22:52    TPro  6.2<L>  /  Alb  2.2<L>  /  TBili  0.3  /  DBili  x   /  AST  95<H>  /  ALT  20  /  AlkPhos  86  04-25    PT/INR - ( 2017 20:31 )   PT: 28.7 sec;   INR: 2.54          PTT - ( 2017 20:31 )  PTT:38.4 sec  Urinalysis Basic - ( 2017 15:01 )    Color: Yellow / Appearance: SL CLOUDY / S.020 / pH: x  Gluc: x / Ketone: NEGATIVE  / Bili: NEGATIVE / Urobili: 0.2 E.U./dL   Blood: x / Protein: Trace mg/dL / Nitrite: NEGATIVE   Leuk Esterase: Large / RBC: Many /HPF / WBC Many /HPF   Sq Epi: x / Non Sq Epi: Rare /HPF / Bacteria: Many /HPF      RADIOLOGY & ADDITIONAL TESTS: OVERNIGHT EVENTS: See chart note for details. CT Abd/Pelvis showing SBP w/ dilatation of the intestine and pneumatosis intestinalis. Surgery consulted, however, pt is a poor surgical candidate, GOC discussion held w/ pt's HCP over phone after which pt was made DNR/DNI-Comfort care w/ IVF and Abx only.    SUBJECTIVE: Alert but lethargic, does not respond to commands. Unable to obtain ROS.     VITAL SIGNS: Last 24 Hrs  T(F): 97.4, Max: 97.4 (04-26 @ 02:00)  HR: 114 (83 - 114)  BP: 75/42 (75/42 - 137/65)  RR: 20 (14 - 20)  SpO2: 97% (94% - 97%)    CAPILLARY BLOOD GLUCOSE  201 (07:39)  102 (03:31)  50 (02:00)  134 (21:21)  50 (20:43)    PHYSICAL EXAM:  General: Lethargic but arousable with loud auditory stimulation, or physical stimulation. Appears much older than stated age. cachetic  HEENT: particles of food found in mouth, dry MM   Neck: supple, no jvd  Cardiac: RRR s1s2 no murmurs  Pulm: Poor inspiratory effort, CTA otherwise  Abdomen: markedly distended, hypoactive bowel sounds, grimace upon deep palpation of abdomen  Rectal: No stool in rectal vault, but gross blood seen on glove.   Extremities: Cool, cyanotic fingertips, Left middle finger (previous amputation) very tender to palpation with edema, and an eschar over tip.   Vasc: 2+ distal pulses  Neurological: AAOx2, follows only basic commands such as "raise your arm" but not "raise your left arm"    MEDICATIONS  (STANDING):  dextrose 5% 1000milliLiter(s) IV Continuous <Continuous>  vancomycin  IVPB 750milliGRAM(s) IV Intermittent every 24 hours  piperacillin/tazobactam IVPB. 3.375Gram(s) IV Intermittent every 6 hours    MEDICATIONS  (PRN):  morphine  - Injectable 2milliGRAM(s) IV Push every 2 hours PRN Moderate Pain (4 - 6)    ALLERGIES: No Known Allergies    LABS: No labs drawn today

## 2017-04-26 NOTE — CONSULT NOTE ADULT - SUBJECTIVE AND OBJECTIVE BOX
DEBBIE EDWARDS   MRN-[id number]     :1945    HPI:  Information is obtained from the chart and ED sign-out as patient is unable to provide history and unable to reach HCP, number in chart.     70 y/o female (poor historian) with pmhx of bipolar dx, schizophrenia, parkinson's, DM, PVD with suspected Raynaud's s/p right middle finger amputation (10/08/16) presented today with her HHA from home to her PCP office, [address], after allegedly being found on the floor at home yesterday. She was found by her HHA on the floor, seemed to be confused, but otherwise alright, so they stayed at home and went to her PCP office today. After arriving at her PCP office, she was sent to the ED.     When arrived at the ED, she was AAOx2, answering questions and complaining of being cold.   She was found to be profoundly hypothermic to 93.6F (rectal) initially. /59. HR 85.   She was placed on a Rosa Hugger, given Ceftriaxone 1GM, and 2L NS.   NCCT with no acute intracranial pathology    Conversation was had with sister (HCP) by ED attending who made patient DNR/DNI. 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, retired , no ETOH/tobacco    ROS:    Unable to attain due to: obtundation                     DYSPNEA (Yoon 0-10):                        N/V (Y/N):                              SECRETIONS (Y/N):                AGITATION(Y/N):   PAIN (Y/N):          -Provocation/Palliation:     -Quality/Quantity:     -Radiating:     -Severity:     -Timing/Frequency:     -Impact on ADLs:    GENERAL: info via pvt aide for >6yr  HEENT: denies  NECK: denies  CVS: denies  RESP: denies  GI: denies  : denies  MS: mostly w/c bound, but can ambulate with walker  NEURO: makes hers needs known appropriately  PSYCH: calm generally when she takes her meds on time  SKIN: finger amputations  LYMPH: denies    ALLERGIES:  Allergies    No Known Allergies    Intolerances    OPIATE NAIVE (Y/N): y  -ISTOP REVIEWED(Y/N): y, ref# [id number]    MEDICATIONS:      MEDICATIONS  (STANDING):  morphine  - Injectable 2milliGRAM(s) IV Push every 2 hours    MEDICATIONS  (PRN):    PHYSICAL EXAM:  Vital Signs Last 24 Hrs  T(C): 38.2, Max: 38.2 ( @ 10:19)  T(F): 100.8, Max: 100.8 ( @ 10:19)  HR: 116 (83 - 116)  BP: 98/62 (75/42 - 137/65)  BP(mean): --  RR: 30 (14 - 30)  SpO2: 96% (94% - 97%)  Daily Height in cm: 152.4 (2017 21:41)    Daily   CAPILLARY BLOOD GLUCOSE  60 (2017 11:29)    I&O's Summary  I & Os for 24h ending 2017 07:00  =============================================  IN: 1850 ml / OUT: 0 ml / NET: 1850 ml    I & Os for current day (as of 2017 14:02)  =============================================  IN: 500 ml / OUT: 0 ml / NET: 500 ml    GENERAL: Elderly woman appearing a bit older than stated age, comfortable appearing  HEENT: mild spont OU opening with mod verbal stimuli, tracked briefly   NECK: no palp masses   CVS:nl S1S2, no M/R/G appreciated, left IJ IV  RESP: RA, resp even and not labored, left ACW PIV  GI: mildly distended, +BS, NT    : incontinent     MS: no spont mov't to extremities noted, bilateral hands cool to touch right 3rd digit partial amputation, left 2nd digit partial amputation with dry gangrene   NEURO: obtunded, easily arouseable with mod verbal stimuli, nodded/shook head to yes/no appropriately to simple questions briefly  PSYCH: obtunded  SKIN: no open lesions    LYMPH: no supraclavicular LAD   KARNOFSKY: PRE-ADMIT=  %               CURRENT= 10%  CACHEXIA (Y/N): y  BMI: 18.6    LABS:    CBC:                        8.9    8.2   )-----------(        ( 2017 14:23 )             26.0     CMP:        152<H>  |  127<H>  |  45<H>  ----------------------------<  104<H>  4.3   |  13<L>  |  1.19    Ca    6.9<L>      2017 22:52    TPro  6.2<L>  /  Alb  2.2<L>  /  TBili  0.3  /  DBili  x   /  AST  95<H>  /  ALT  20  /  AlkPhos  86      PT/INR - ( 2017 20:31 )   PT: 28.7 sec;   INR: 2.54     PTT - ( 2017 20:31 )  PTT:38.4 sec  LIVER FUNCTIONS - ( 2017 14:47 )  Alb: 2.2 g/dL / Pro: 6.2 g/dL / ALK PHOS: 86 U/L / ALT: 20 U/L / AST: 95 U/L / GGT: x           Urinalysis Basic - ( 2017 15:01 )    Color: Yellow / Appearance: SL CLOUDY / S.020 / pH: x  Gluc: x / Ketone: NEGATIVE  / Bili: NEGATIVE / Urobili: 0.2 E.U./dL   Blood: x / Protein: Trace mg/dL / Nitrite: NEGATIVE   Leuk Esterase: Large / RBC: Many /HPF / WBC Many /HPF   Sq Epi: x / Non Sq Epi: Rare /HPF / Bacteria: Many /HPF    IMAGING:  Reviewed  EXAM:  CT ABDOMEN AND PELVIS IC                          PROCEDURE DATE:  2017    IMPRESSION:    Severe diffuse small bowel distention with air-fluid levels and   intestinal pneumatosis, suggestive of bowel ischemia. A discrete point of   obstruction is not identified.    Portal venous gas.    Moderate ascites.    Thickened endometrium as seen on prior studies.    Cholelithiasis.    EXAM:  CT BRAIN                        PROCEDURE DATE:  2017  I  IMPRESSION:-  1.  Soft-tissue swelling in themidline parietal scalp.  2.  No intracranial hemorrhage or mass.  3.  Several small and poorly defined hypodense areas in the   periventricular white matter of bilateral cerebral hemispheres.    EXAM:  CT CERVICAL SPINE                          PROCEDURE DATE:  2017     IMPRESSION: -  1.  No fracture or subluxation.  2.  Minimal spondylosis at C5-C6 level.     ADVANCED DIRECTIVES:   DNR/DNI       DECISION MAKER:  LEGAL SURROGATE: Annieyung Sibley [phone] (sister)    GOALS OF CARE DISCUSSION       Palliative care info/counseling provided to pvt HA	                   REFERRALS	        Unit SW/Case Mgmt       Hospice

## 2017-04-26 NOTE — CHART NOTE - NSCHARTNOTEFT_GEN_A_CORE
Pt had CT A/P read as high grade SBO. Surgery consulted and pt deemed poor surgical candidate, so no intervention planned. Pt's VS destabilizing, lactic acid increasing. I spoke to pt's sister and HCP, Annie Sibley, at length where we discussed the patient's extremely poor prognosis. Ms. Sibley had spoken to ED attending, Dr. Leon, earlier in the day, where she expressed her wish that the patient be DNR/DNI, but given the development in the case, I felt it necessary to further delineate goals of care. Ms. Sibley was very understanding of her sister's condition and very clearly expressed her desire that the main goal to be the patient's comfort. She agreed that the patient should not undergo any surgical intervention. She said that she had been anticipating an event like this for a while, as her sister's condition had been steadily declining for some time. She said that she was currently out of town, but would be returning this morning to come to the hospital. I told her that at this time, I would continue giving the patient antibiotics and IVF in order to try to have her survive until morning, but that I also would like to give her pain medication to facilitate her comfort, but doing so may hasten her death, as the patient was already hypotensive. She understood and wished to give the pain medication, as she wanted her sister to be as comfortable as possible. Per her wishes, at this time, will discontinue all unnecessary medications (except abx and IVF), all blood draws, VS checks, and NGT (which had been placed after CT read as SBO). Will start Morphine PRN for pain or respiratory distress. I told her that I would not be surprised if the patient  overnight before she was able to make it to the hospital and she says this is fine. She thanked me for the phone call. I have clearly expressed these wishes to the intern, resident, and nursing staff, who all are in agreement with the plan.

## 2017-04-27 LAB
-  AMPICILLIN/SULBACTAM: SIGNIFICANT CHANGE UP
-  AMPICILLIN: SIGNIFICANT CHANGE UP
-  CEFAZOLIN: SIGNIFICANT CHANGE UP
-  CEFTRIAXONE: SIGNIFICANT CHANGE UP
-  CIPROFLOXACIN: SIGNIFICANT CHANGE UP
-  GENTAMICIN: SIGNIFICANT CHANGE UP
-  NITROFURANTOIN: SIGNIFICANT CHANGE UP
-  PIPERACILLIN/TAZOBACTAM: SIGNIFICANT CHANGE UP
-  TOBRAMYCIN: SIGNIFICANT CHANGE UP
-  TRIMETHOPRIM/SULFAMETHOXAZOLE: SIGNIFICANT CHANGE UP
CULTURE RESULTS: SIGNIFICANT CHANGE UP
METHOD TYPE: SIGNIFICANT CHANGE UP
ORGANISM # SPEC MICROSCOPIC CNT: SIGNIFICANT CHANGE UP
ORGANISM # SPEC MICROSCOPIC CNT: SIGNIFICANT CHANGE UP
SPECIMEN SOURCE: SIGNIFICANT CHANGE UP

## 2017-04-27 PROCEDURE — 99239 HOSP IP/OBS DSCHRG MGMT >30: CPT

## 2017-04-27 PROCEDURE — 72125 CT NECK SPINE W/O DYE: CPT

## 2017-04-27 PROCEDURE — 99233 SBSQ HOSP IP/OBS HIGH 50: CPT

## 2017-04-27 PROCEDURE — 87086 URINE CULTURE/COLONY COUNT: CPT

## 2017-04-27 PROCEDURE — 84484 ASSAY OF TROPONIN QUANT: CPT

## 2017-04-27 PROCEDURE — 93005 ELECTROCARDIOGRAM TRACING: CPT | Mod: XU

## 2017-04-27 PROCEDURE — 82550 ASSAY OF CK (CPK): CPT

## 2017-04-27 PROCEDURE — 80053 COMPREHEN METABOLIC PANEL: CPT

## 2017-04-27 PROCEDURE — 96374 THER/PROPH/DIAG INJ IV PUSH: CPT | Mod: XU

## 2017-04-27 PROCEDURE — 71045 X-RAY EXAM CHEST 1 VIEW: CPT

## 2017-04-27 PROCEDURE — 84443 ASSAY THYROID STIM HORMONE: CPT

## 2017-04-27 PROCEDURE — 74177 CT ABD & PELVIS W/CONTRAST: CPT

## 2017-04-27 PROCEDURE — 70450 CT HEAD/BRAIN W/O DYE: CPT

## 2017-04-27 PROCEDURE — 87186 SC STD MICRODIL/AGAR DIL: CPT

## 2017-04-27 PROCEDURE — 99285 EMERGENCY DEPT VISIT HI MDM: CPT | Mod: 25

## 2017-04-27 PROCEDURE — 82040 ASSAY OF SERUM ALBUMIN: CPT

## 2017-04-27 PROCEDURE — 74020: CPT

## 2017-04-27 PROCEDURE — 51701 INSERT BLADDER CATHETER: CPT

## 2017-04-27 PROCEDURE — 80048 BASIC METABOLIC PNL TOTAL CA: CPT

## 2017-04-27 PROCEDURE — 81001 URINALYSIS AUTO W/SCOPE: CPT

## 2017-04-27 PROCEDURE — 87040 BLOOD CULTURE FOR BACTERIA: CPT

## 2017-04-27 PROCEDURE — 83605 ASSAY OF LACTIC ACID: CPT

## 2017-04-27 PROCEDURE — 85025 COMPLETE CBC W/AUTO DIFF WBC: CPT

## 2017-04-27 PROCEDURE — 82533 TOTAL CORTISOL: CPT

## 2017-04-27 PROCEDURE — 36415 COLL VENOUS BLD VENIPUNCTURE: CPT

## 2017-04-27 PROCEDURE — 82553 CREATINE MB FRACTION: CPT

## 2017-04-27 PROCEDURE — 85730 THROMBOPLASTIN TIME PARTIAL: CPT

## 2017-04-27 PROCEDURE — 85610 PROTHROMBIN TIME: CPT

## 2017-04-27 RX ORDER — MORPHINE SULFATE 50 MG/1
2 CAPSULE, EXTENDED RELEASE ORAL
Qty: 0 | Refills: 0 | COMMUNITY
Start: 2017-04-27

## 2017-04-27 RX ADMIN — MORPHINE SULFATE 2 MILLIGRAM(S): 50 CAPSULE, EXTENDED RELEASE ORAL at 14:14

## 2017-04-27 RX ADMIN — MORPHINE SULFATE 2 MILLIGRAM(S): 50 CAPSULE, EXTENDED RELEASE ORAL at 12:07

## 2017-04-27 RX ADMIN — MORPHINE SULFATE 2 MILLIGRAM(S): 50 CAPSULE, EXTENDED RELEASE ORAL at 06:05

## 2017-04-27 RX ADMIN — MORPHINE SULFATE 2 MILLIGRAM(S): 50 CAPSULE, EXTENDED RELEASE ORAL at 00:17

## 2017-04-27 RX ADMIN — MORPHINE SULFATE 2 MILLIGRAM(S): 50 CAPSULE, EXTENDED RELEASE ORAL at 00:02

## 2017-04-27 RX ADMIN — MORPHINE SULFATE 2 MILLIGRAM(S): 50 CAPSULE, EXTENDED RELEASE ORAL at 08:38

## 2017-04-27 RX ADMIN — MORPHINE SULFATE 2 MILLIGRAM(S): 50 CAPSULE, EXTENDED RELEASE ORAL at 02:01

## 2017-04-27 RX ADMIN — MORPHINE SULFATE 2 MILLIGRAM(S): 50 CAPSULE, EXTENDED RELEASE ORAL at 14:45

## 2017-04-27 RX ADMIN — MORPHINE SULFATE 2 MILLIGRAM(S): 50 CAPSULE, EXTENDED RELEASE ORAL at 11:40

## 2017-04-27 RX ADMIN — MORPHINE SULFATE 2 MILLIGRAM(S): 50 CAPSULE, EXTENDED RELEASE ORAL at 04:07

## 2017-04-27 RX ADMIN — MORPHINE SULFATE 2 MILLIGRAM(S): 50 CAPSULE, EXTENDED RELEASE ORAL at 06:20

## 2017-04-27 RX ADMIN — MORPHINE SULFATE 2 MILLIGRAM(S): 50 CAPSULE, EXTENDED RELEASE ORAL at 10:14

## 2017-04-27 RX ADMIN — MORPHINE SULFATE 2 MILLIGRAM(S): 50 CAPSULE, EXTENDED RELEASE ORAL at 08:04

## 2017-04-27 RX ADMIN — MORPHINE SULFATE 2 MILLIGRAM(S): 50 CAPSULE, EXTENDED RELEASE ORAL at 02:16

## 2017-04-27 RX ADMIN — MORPHINE SULFATE 2 MILLIGRAM(S): 50 CAPSULE, EXTENDED RELEASE ORAL at 04:21

## 2017-04-27 NOTE — PROGRESS NOTE ADULT - PROBLEM SELECTOR PLAN 7
-  #FLUIDS: None    #ELECTROLYTES: No plan of further lab draws    #NUTRITION: NPO
-  #FLUIDS: None    #ELECTROLYTES: No plan of further lab draws    #NUTRITION: NPO

## 2017-04-27 NOTE — PROGRESS NOTE ADULT - ASSESSMENT
70 y/o cachectic woman with h/o bipolar d/o, schizophrenia, Parkinson's, DM, PVD with right 3rd and left 2nd digit partial amputation, lung and knee surgery, glaucoma presenting from PMD's office post fall on previous day and AMS, found to be hypothermic with hypoalbumenmia, UTI and Ileus, high grade SBO and pneumatosis
70 y/o F w/ a PMHx of bipolar do, schizophrenia, parkinson's, DM, and PVD, admitted for TME secondary to severe sepsis w/ septic shock secondary to ischemic colitis secondary to SBO, on comfort care, awaiting placement.

## 2017-04-27 NOTE — DISCHARGE NOTE ADULT - MEDICATION SUMMARY - MEDICATIONS TO STOP TAKING
I will STOP taking the medications listed below when I get home from the hospital:    SEROquel 100 mg oral tablet  -- 1 tab(s) by mouth once a day (at bedtime)    brimonidine 0.2% ophthalmic solution  -- 1 drop(s) to each affected eye 2 times a day    Depakote 500 mg oral delayed release tablet  -- 1 tab(s) by mouth 2 times a day    apixaban 5 mg oral tablet  -- 2 tab(s) by mouth 2 times a day    ascorbic acid 250 mg oral tablet  -- 1 tab(s) by mouth once a day    Multiple Vitamins oral tablet  -- 1 tab(s) by mouth once a day    pantoprazole 40 mg oral delayed release tablet  -- 1 tab(s) by mouth once a day before breakfast

## 2017-04-27 NOTE — PROGRESS NOTE ADULT - SUBJECTIVE AND OBJECTIVE BOX
DEBBIE EDWARDS   MRN-[id number]         CC: Patient is a 71y old  Female who presents with a chief complaint of Fall/UTI/AMS/Hypothermia (27 Apr 2017 10:59). Inpt hospice bed available today in [place] per Palliative Care     ROS:  UNABLE TO OBTAIN  due to: lethargy    DYSPNEA (Y/N):	  N/V (Y/N):	  SECRETIONS (Y/N):	  AGITATION (Y/N):  PAIN(Y/N):        -Provocation/Palliation:     -Quality/Quantity:     -Radiating:     -Severity:     -Timing/Frequency:     -Impact on ADLs:     OTHER REVIEW OF SYSTEMS:  ALLERGIES:  No Known Allergies    OPIATE NAIVE (Y/N): y  -ISTOP REVIEWED(Y/N): y, ref# [id number]    MEDICATIONS: reviewed  MEDICATIONS  (STANDING):  morphine  - Injectable 2milliGRAM(s) IV Push every 2 hours    MEDICATIONS  (PRN):    PHYSICAL EXAM: No vitals per family  GENERAL: Lethargic, but arouseable with mod verbal stimuli.  Appears comfortable, FLACC=0  HEENT: mild OU opening with name called     	  NECK: no palp masses          CVS: nl S1S2, no M/R/G           	  RESP: RA, resp even and not labored        	  GI: +BS x4 quads, soft, NT, ND             	  : incontinent         	  MUSC: no spont mov't to extremities noted      	  NEURO: lethargic     	  PSYCH: lethargic        SKIN: bilateral hands cool to touch right 3rd digit partial amputation, left 2nd digit partial amputation with dry gangrene   LYMPH: no supraclavicular LAD         LABS: reviewed  none new             IMAGING: reviewed  none new    ADVANCED DIRECTIVES:      DNR     DNI         DECISION MAKER: Annie Sibley [phone] (sister)  LEGAL SURROGATE:Annie Toñito [phone] (sister)    PSYCHOSOCIAL-SPIRITUAL ASSESSMENT:       Reviewed         GOALS OF CARE DISCUSSION       Palliative care info/counseling provided	           Advanced Directives addressed (*please see Advance Care Planning Note)	           See previous Palliative Medicine Note      	      AGENCY CHOICE DISCUSSED:           Hospice          REFERRALS	        Palliative Med        Unit SW/Case Mgmt       Hospice         [ ]CRITICAL CARE TIME PROVIDED TO UNSTABLE PT W/ ORGAN FAILURE    Start:               End:  	       Minutes:          [x]> 50% OF THE TIME SPENT IN COUNSELING AND COORDINATING CARE   Start:               End:  	       Minutes:  [ ]PROLONGED SERVICE             FACE TO FACE: [x]PT     [ ]PT & FAMILY   Start:               End:  	       Minutes:

## 2017-04-27 NOTE — PROGRESS NOTE ADULT - PROBLEM SELECTOR PLAN 1
-  Pt initially presented w/ severe sepsis in the setting of ischemic colitis and pneumatosis intestinalis vs. UTI w/ +UA and urine cx. Pt's one set of blood cx growing GPC, however, likely contamination. s/p Vanc/Zosyn for empiric coverage. Surgery consulted, however, no intervention planned. Hypotensive overnight, however, GOC discussion held and pt is made comfort care.  - c/w symptomatic management per Palliatve care consult

## 2017-04-27 NOTE — PROGRESS NOTE ADULT - PROBLEM SELECTOR PLAN 2
-  Pt initially presented altered. CT head unremarkable for intracranial acute pathology. Likely in the setting of SBO/sepsis/UTI.   - c/w symptomatic management per Palliative care consult
-  Pt initially presented altered. CT head unremarkable for intracranial acute pathology. Likely in the setting of SBO/sepsis/UTI.   - c/w symptomatic management per Palliative care consult

## 2017-04-27 NOTE — DISCHARGE NOTE ADULT - SECONDARY DIAGNOSIS.
Hypernatremia Toxic metabolic encephalopathy UTI (urinary tract infection) Diabetes Small bowel obstruction

## 2017-04-27 NOTE — PROGRESS NOTE ADULT - PROBLEM SELECTOR PLAN 1
case d/w primary team and sister via phone.  All are in agreement for inpt hospice today, pickup set up for 2p pickup.  Pt is DNR/DNI

## 2017-04-27 NOTE — PROGRESS NOTE ADULT - PROBLEM SELECTOR PLAN 3
-  Pt w/ hx of SBO, current presentation likely in the setting of SBO complicating into pneumatosis intestinalis and ischemic colitis. Surgery consulted, however, no intervention planned.   - c/w Symptomatic management as per Palliative care consult.
-  Pt w/ hx of SBO, current presentation likely in the setting of SBO complicating into pneumatosis intestinalis and ischemic colitis. Surgery consulted, however, no intervention planned.   - c/w Symptomatic management as per Palliative care consult.

## 2017-04-27 NOTE — DISCHARGE NOTE ADULT - PATIENT PORTAL LINK FT
“You can access the Follow[employer] Patient Portal, offered by [facility], by registering with the following website: [email or web address]”

## 2017-04-27 NOTE — PROGRESS NOTE ADULT - PROBLEM SELECTOR PLAN 6
-  Likely in the setting of severe dehydration/sepsis.  - c/w Symptomatic management as per Palliative care consult.
-  Likely in the setting of severe dehydration/sepsis.  - c/w Symptomatic management as per Palliative care consult.

## 2017-04-27 NOTE — DISCHARGE NOTE ADULT - PLAN OF CARE
Resolution You presented to us with very sick due to severe sepsis with septic shock in the setting of ischemic colitis with air in your gut wall due to small bowel obstruction. We consulted our surgeon, however, given your overall health status, we did not recommend surgery. We gave you IV antibiotics, however, withdrew care upon your health care proxy's request due to your recurrent and worsening prognosis. We are discharging you to a hospice for further care. secondary to dehydration secondary to small bowel obstruction Surgery was consulted and not surgical candidate continue with symptomatic treatment

## 2017-04-27 NOTE — PROGRESS NOTE ADULT - SUBJECTIVE AND OBJECTIVE BOX
OVERNIGHT EVENTS:    VITAL SIGNS:   Vital Signs Last 24 Hrs  T(F): 100.8, Max: 100.8 ( @ 10:19)  HR: 116 (116 - 116)  BP: 98/62 (98/62 - 98/62)  RR: 30 (30 - 30)  SpO2: 96% (96% - 96%)  Wt(kg): --    CAPILLARY BLOOD GLUCOSE  60 (11:29)      I&O's Summary      PHYSICAL EXAM:  Constitutional: well appearing, WDWN, NAD  HEENT: NCAT, PEERL, sclera non-icteric, no conjunctival pallor  Neck: supple, no JVD  Respiratory: CTA b/l, no wheezes, No rales, No rhonchi  Cardiovascular: RRR, normal s1/s2, no MRG  Gastrointestinal: soft, NTND, +BS, no guarding, no organomegaly  Extremities: WWP, DP/radial pulses 2+ b/l, no edema  Neurological: AAOx 3, responds to commands, moves all extremities, CN 2-12 intact  Musculoskeletal: 5/5 strength throughout    MEDICATIONS  (STANDING):  morphine  - Injectable 2milliGRAM(s) IV Push every 2 hours    MEDICATIONS  (PRN):      ALLERGIES: No Known Allergies      INTOLERANCES:   LABS:                        8.9    8.2   )-----------( 28       ( 2017 14:23 )             26.0         152<H>  |  127<H>  |  45<H>  ----------------------------<  104<H>  4.3   |  13<L>  |  1.19    Ca    6.9<L>      2017 22:52    TPro  x   /  Alb  2.1<L>  /  TBili  x   /  DBili  x   /  AST  x   /  ALT  x   /  AlkPhos  x       PT/INR - ( 2017 20:31 )   PT: 28.7 sec;   INR: 2.54          PTT - ( 2017 20:31 )  PTT:38.4 sec  Urinalysis Basic - ( 2017 15:01 )    Color: Yellow / Appearance: SL CLOUDY / S.020 / pH: x  Gluc: x / Ketone: NEGATIVE  / Bili: NEGATIVE / Urobili: 0.2 E.U./dL   Blood: x / Protein: Trace mg/dL / Nitrite: NEGATIVE   Leuk Esterase: Large / RBC: Many /HPF / WBC Many /HPF   Sq Epi: x / Non Sq Epi: Rare /HPF / Bacteria: Many /HPF      RADIOLOGY & ADDITIONAL TESTS: OVERNIGHT EVENTS: RENÉ    SUBJECTIVE: Opens eye to command, does not communicate, responds to noxious stimuli.     VITAL SIGNS: Last 24 Hrs  T(F): 100.8, Max: 100.8 (04-26 @ 10:19)  HR: 116 (116 - 116)  BP: 98/62 (98/62 - 98/62)  RR: 30 (30 - 30)  SpO2: 96% (96% - 96%)    CAPILLARY BLOOD GLUCOSE  60 (11:29)    PHYSICAL EXAM:  General: Lethargic but arousable, cachetic elderly female  HEENT: Pin-point pupils, left sided ptosis, tongue deviated to the right, dry MM, neck supple  CV: RRR, S1, S2, no murmurs, 1+ DP/PT pulses, 2+ radial pulses b/l  Pulm: Poor inspiratory effort, bibasilar crackles, not in respiratory distress  Abdomen: tense, TTP, hypoactive bowel sounds  Extremities: Cool, cyanotic fingertips, s/p L middle finger amputation, b/l leg chronic venous rash  Neurological: Alert, follows only basic commands, responds to noxious stimuli, right arm contracted    MEDICATIONS  (STANDING):  morphine  - Injectable 2milliGRAM(s) IV Push every 2 hours    ALLERGIES: No Known Allergies    LABS: No labs drawn today

## 2017-04-27 NOTE — DISCHARGE NOTE ADULT - HOSPITAL COURSE
Pt yaron 70 y/o F w/ a PMHx of bipolard d/o, schizophrenia, Parkinson's disease, DM, hx of SBOs who was initially brought in on 4/25, one day after being found on the floor by pt's HHA after a suspected mechanical fall. Pt was found to be in severe sepsis w/ septic shock in the setting of ischemic colitis w/ pneumatosis intestinalis secondary to small bowel obstruction. Pt was also found with a positive UA. Pt was initially started on Vanc/Zosyn. Surgery was consulted, however, no intervention was recommended given pt's tenuous condition. A GOC meeting was held w/ pt's HCP overnight, and pt was made DNR/DNI w/ comfort care. At this time, pt is being transferred to a hospice for further management of pt's palliative care.

## 2017-04-27 NOTE — DISCHARGE NOTE ADULT - MEDICATION SUMMARY - MEDICATIONS TO TAKE
I will START or STAY ON the medications listed below when I get home from the hospital:    morphine  -- 2 milligram(s) intravenous every 2 hours  -- Indication: For Pain control    brimonidine 0.2% ophthalmic solution  -- 1 drop(s) to each affected eye 2 times a day  -- Indication: For Glaucoma

## 2017-04-27 NOTE — DISCHARGE NOTE ADULT - CARE PLAN
Principal Discharge DX:	Severe sepsis with septic shock  Goal:	Resolution  Instructions for follow-up, activity and diet:	You presented to us with very sick due to severe sepsis with septic shock in the setting of ischemic colitis with air in your gut wall due to small bowel obstruction. We consulted our surgeon, however, given your overall health status, we did not recommend surgery. We gave you IV antibiotics, however, withdrew care upon your health care proxy's request due to your recurrent and worsening prognosis. We are discharging you to a hospice for further care. Principal Discharge DX:	Severe sepsis with septic shock  Instructions for follow-up, activity and diet:	You presented to us with very sick due to severe sepsis with septic shock in the setting of ischemic colitis with air in your gut wall due to small bowel obstruction. We consulted our surgeon, however, given your overall health status, we did not recommend surgery. We gave you IV antibiotics, however, withdrew care upon your health care proxy's request due to your recurrent and worsening prognosis. We are discharging you to a hospice for further care.  Secondary Diagnosis:	Hypernatremia  Instructions for follow-up, activity and diet:	secondary to dehydration  Secondary Diagnosis:	Toxic metabolic encephalopathy  Instructions for follow-up, activity and diet:	secondary to small bowel obstruction  Secondary Diagnosis:	UTI (urinary tract infection)  Instructions for follow-up, activity and diet:	continue with symptomatic treatment  Secondary Diagnosis:	Diabetes  Instructions for follow-up, activity and diet:	continue with symptomatic treatment  Secondary Diagnosis:	Small bowel obstruction  Instructions for follow-up, activity and diet:	Surgery was consulted and not surgical candidate

## 2017-04-27 NOTE — PROGRESS NOTE ADULT - PROBLEM SELECTOR PLAN 5
-  Pt w/ an TOM in the setting of severe sepsis.  - c/w Symptomatic management as per Palliative care consult.
-  Pt w/ an TOM in the setting of severe sepsis.  - c/w Symptomatic management as per Palliative care consult.

## 2017-04-27 NOTE — PROGRESS NOTE ADULT - PROBLEM SELECTOR PLAN 4
-  Pt w/ altered mental status, w/ +UA and urine culture growing >100K CFU of GNR. One set of blood culture growing GPC - however, likely contamination since only one bottle growing. s/p Vanc/Zosyn. No further abx as per GOC discussion.  - c/w Symptomatic management as per Palliative care consult.
-  Pt w/ altered mental status, w/ +UA and urine culture growing >100K CFU of GNR. One set of blood culture growing GPC - however, likely contamination since only one bottle growing. s/p Vanc/Zosyn. No further abx as per GOC discussion.  - c/w Symptomatic management as per Palliative care consult.

## 2017-04-27 NOTE — PROGRESS NOTE ADULT - PROBLEM SELECTOR PLAN 8
-  #DVT PPx: None    #DISPO: f/u Palliative consult    #CODE: DNR/DNI - comfort care
-  #DVT PPx: None    #DISPO: f/u Palliative consult    #CODE: DNR/DNI - comfort care

## 2017-04-28 LAB
-  CEFAZOLIN: SIGNIFICANT CHANGE UP
-  CLINDAMYCIN: SIGNIFICANT CHANGE UP
-  ERYTHROMYCIN: SIGNIFICANT CHANGE UP
-  LINEZOLID: SIGNIFICANT CHANGE UP
-  OXACILLIN: SIGNIFICANT CHANGE UP
-  PENICILLIN: SIGNIFICANT CHANGE UP
-  RIFAMPIN: SIGNIFICANT CHANGE UP
-  TRIMETHOPRIM/SULFAMETHOXAZOLE: SIGNIFICANT CHANGE UP
-  VANCOMYCIN: SIGNIFICANT CHANGE UP
METHOD TYPE: SIGNIFICANT CHANGE UP

## 2017-04-30 LAB
CULTURE RESULTS: SIGNIFICANT CHANGE UP
SPECIMEN SOURCE: SIGNIFICANT CHANGE UP

## 2017-05-01 DIAGNOSIS — L89.220 PRESSURE ULCER OF LEFT HIP, UNSTAGEABLE: ICD-10-CM

## 2017-05-01 DIAGNOSIS — A41.9 SEPSIS, UNSPECIFIED ORGANISM: ICD-10-CM

## 2017-05-01 DIAGNOSIS — E87.0 HYPEROSMOLALITY AND HYPERNATREMIA: ICD-10-CM

## 2017-05-01 DIAGNOSIS — B96.89 OTHER SPECIFIED BACTERIAL AGENTS AS THE CAUSE OF DISEASES CLASSIFIED ELSEWHERE: ICD-10-CM

## 2017-05-01 DIAGNOSIS — Z91.81 HISTORY OF FALLING: ICD-10-CM

## 2017-05-01 DIAGNOSIS — Z51.5 ENCOUNTER FOR PALLIATIVE CARE: ICD-10-CM

## 2017-05-01 DIAGNOSIS — F31.9 BIPOLAR DISORDER, UNSPECIFIED: ICD-10-CM

## 2017-05-01 DIAGNOSIS — R41.82 ALTERED MENTAL STATUS, UNSPECIFIED: ICD-10-CM

## 2017-05-01 DIAGNOSIS — K63.89 OTHER SPECIFIED DISEASES OF INTESTINE: ICD-10-CM

## 2017-05-01 DIAGNOSIS — Z89.021 ACQUIRED ABSENCE OF RIGHT FINGER(S): ICD-10-CM

## 2017-05-01 DIAGNOSIS — G20 PARKINSON'S DISEASE: ICD-10-CM

## 2017-05-01 DIAGNOSIS — I95.9 HYPOTENSION, UNSPECIFIED: ICD-10-CM

## 2017-05-01 DIAGNOSIS — R68.0 HYPOTHERMIA, NOT ASSOCIATED WITH LOW ENVIRONMENTAL TEMPERATURE: ICD-10-CM

## 2017-05-01 DIAGNOSIS — H40.9 UNSPECIFIED GLAUCOMA: ICD-10-CM

## 2017-05-01 DIAGNOSIS — N17.9 ACUTE KIDNEY FAILURE, UNSPECIFIED: ICD-10-CM

## 2017-05-01 DIAGNOSIS — Z66 DO NOT RESUSCITATE: ICD-10-CM

## 2017-05-01 DIAGNOSIS — E87.2 ACIDOSIS: ICD-10-CM

## 2017-05-01 DIAGNOSIS — G92 TOXIC ENCEPHALOPATHY: ICD-10-CM

## 2017-05-01 DIAGNOSIS — K55.1 CHRONIC VASCULAR DISORDERS OF INTESTINE: ICD-10-CM

## 2017-05-01 DIAGNOSIS — L89.212 PRESSURE ULCER OF RIGHT HIP, STAGE 2: ICD-10-CM

## 2017-05-01 DIAGNOSIS — N39.0 URINARY TRACT INFECTION, SITE NOT SPECIFIED: ICD-10-CM

## 2017-05-01 DIAGNOSIS — E11.649 TYPE 2 DIABETES MELLITUS WITH HYPOGLYCEMIA WITHOUT COMA: ICD-10-CM

## 2017-05-01 DIAGNOSIS — R65.21 SEVERE SEPSIS WITH SEPTIC SHOCK: ICD-10-CM

## 2017-05-01 DIAGNOSIS — F20.9 SCHIZOPHRENIA, UNSPECIFIED: ICD-10-CM

## 2017-05-01 DIAGNOSIS — I73.00 RAYNAUD'S SYNDROME WITHOUT GANGRENE: ICD-10-CM

## 2017-05-01 DIAGNOSIS — K56.60 UNSPECIFIED INTESTINAL OBSTRUCTION: ICD-10-CM

## 2018-03-03 NOTE — DISCHARGE NOTE ADULT - PATIENT PORTAL LINK FT
“You can access the FollowHealth Patient Portal, offered by [facility], by registering with the following website: [email or web address]”
Declined

## 2018-10-22 NOTE — PROVIDER CONTACT NOTE (CRITICAL VALUE NOTIFICATION) - SITUATION
Spoke with Dr Sol Rogers regarding patient's respiratory status . New orders received . Pt on heparin drip at 11 ml/hr. Critical value of aptt greater than 200.

## 2019-01-23 NOTE — PATIENT PROFILE ADULT. - FALL HARM RISK TYPE OF ASSESSMENT
Admission

## 2020-01-06 NOTE — PROGRESS NOTE ADULT - PROBLEM/PLAN-3
[Time Spent: ___ minutes] : I have spent [unfilled] minutes of face to face time with the patient
DISPLAY PLAN FREE TEXT
DISPLAY PLAN FREE TEXT

## 2020-07-24 NOTE — PROGRESS NOTE ADULT - PROBLEM/PLAN-1
DISPLAY PLAN FREE TEXT
Diabetes

## 2022-06-28 NOTE — PROGRESS NOTE ADULT - PROBLEM SELECTOR PLAN 5
nml lactate  Ph 7.38 on abg  suspect due to NS infusion + infection  NS dc'd Attending Attestation (For Attendings USE Only)...

## 2023-01-10 NOTE — ED ADULT NURSE NOTE - NURSING ED SKIN COLOR
Message noted: Chart reviewed and may refill medication as requested. Script sent to listed pharmacy by secure method.     Pt notified through [facility] normal for race

## 2023-03-29 NOTE — PROGRESS NOTE ADULT - PROBLEM SELECTOR PLAN 5
[Normal] : no rash concern for infection (2nd digit gangrene)  f/u bld cx  c/w Vancomycin + Zosyn  - f/u ESR, CRP

## 2024-08-22 NOTE — DISCHARGE NOTE ADULT - NS AS DC STROKE DX YN
Result Communication    Resulted Orders   Urine Culture   Result Value Ref Range    Urine Culture No significant growth    C. Trachomatis / N. Gonorrhoeae, Amplified Detection   Result Value Ref Range    Neisseria gonorrhea,Amplified Negative Negative    Chlamydia trachomatis, Amplified Negative Negative    Narrative    The APTIMA Combo 2 assay is FDA-approved NAAT using target capture for the in vitro qualitative detection and differentiation of ribosomal RNA (rRNA) for Chlamydia trachomatis and Neisseria gonorrhoeae testing on clinician-collected endocervical, PreservCyt solution liquid Pap specimens, vaginal, throat, rectal, and male urethral swab specimens; patient-collected vaginal swab specimens, and female and male urine specimens from symptomatic and asymptomatic individuals. Samples from all other sites are not validated for this method.   Trichomonas vaginalis, Nucleic Acid Detection   Result Value Ref Range    Trichomonas Vaginalis Negative Negative, Invalid, TRICH neg      Comment:      Performance characteristics for Trichomonas Vaginalis testing on urine samples has been validated by [facility].  Testing on this sample type is not FDA-approved, but such approval is not necessary. This laboratory is certified by CLIA to perform high complexity testing.    Narrative    The APTIMA Trichomonas vaginalis assay is FDA-approved for  testing on female endocervical swabs, vaginal swabs, and  ThinPrep liquid pap samples. Performance characteristics  for Trichomonas vaginalis on specific non-FDA-approved  sample types (female and male urine and male urethral  swabs) have been validated by [facility]. This laboratory is certified by  CLIA to perform high complexity testing. Samples from all  other sites are not validated for this method.       11:46 AM      Results were successfully communicated with the patient and they acknowledged their  understanding.    Additional rx for  pyridium being sent to pharmacy and patient was following up with urology      no